# Patient Record
Sex: FEMALE | Race: OTHER | HISPANIC OR LATINO
[De-identification: names, ages, dates, MRNs, and addresses within clinical notes are randomized per-mention and may not be internally consistent; named-entity substitution may affect disease eponyms.]

---

## 2018-08-17 PROBLEM — Z00.00 ENCOUNTER FOR PREVENTIVE HEALTH EXAMINATION: Status: ACTIVE | Noted: 2018-08-17

## 2018-08-23 ENCOUNTER — APPOINTMENT (OUTPATIENT)
Dept: HEART AND VASCULAR | Facility: CLINIC | Age: 53
End: 2018-08-23
Payer: COMMERCIAL

## 2018-08-23 VITALS
HEART RATE: 74 BPM | SYSTOLIC BLOOD PRESSURE: 134 MMHG | DIASTOLIC BLOOD PRESSURE: 90 MMHG | WEIGHT: 155 LBS | BODY MASS INDEX: 28.52 KG/M2 | HEIGHT: 62 IN

## 2018-08-23 DIAGNOSIS — L71.9 ROSACEA, UNSPECIFIED: ICD-10-CM

## 2018-08-23 DIAGNOSIS — R07.9 CHEST PAIN, UNSPECIFIED: ICD-10-CM

## 2018-08-23 DIAGNOSIS — E78.5 HYPERLIPIDEMIA, UNSPECIFIED: ICD-10-CM

## 2018-08-23 DIAGNOSIS — I10 ESSENTIAL (PRIMARY) HYPERTENSION: ICD-10-CM

## 2018-08-23 PROCEDURE — 93000 ELECTROCARDIOGRAM COMPLETE: CPT

## 2018-08-23 PROCEDURE — 99205 OFFICE O/P NEW HI 60 MIN: CPT | Mod: 25

## 2018-08-23 RX ORDER — METRONIDAZOLE 7.5 MG/G
0.75 CREAM TOPICAL
Refills: 0 | Status: ACTIVE | COMMUNITY

## 2018-08-23 RX ORDER — ESOMEPRAZOLE MAGNESIUM 40 MG/1
CAPSULE, DELAYED RELEASE ORAL
Refills: 0 | Status: ACTIVE | COMMUNITY

## 2018-08-27 LAB
ALBUMIN SERPL ELPH-MCNC: 4.7 G/DL
ALDOSTERONE SERUM: 14.5 NG/DL
ALP BLD-CCNC: 79 U/L
ALT SERPL-CCNC: 13 U/L
ANION GAP SERPL CALC-SCNC: 13 MMOL/L
APPEARANCE: ABNORMAL
AST SERPL-CCNC: 19 U/L
BACTERIA: ABNORMAL
BASOPHILS # BLD AUTO: 0.01 K/UL
BASOPHILS NFR BLD AUTO: 0.2 %
BILIRUB SERPL-MCNC: 0.2 MG/DL
BILIRUBIN URINE: ABNORMAL
BLOOD URINE: NEGATIVE
BUN SERPL-MCNC: 12 MG/DL
CALCIUM SERPL-MCNC: 9.6 MG/DL
CALCIUM SERPL-MCNC: 9.6 MG/DL
CHLORIDE SERPL-SCNC: 104 MMOL/L
CHOLEST SERPL-MCNC: 204 MG/DL
CHOLEST/HDLC SERPL: 4.4 RATIO
CK MB BLD-MCNC: 1.3 NG/ML
CK MB CFR SERPL: 1.3 %
CK SERPL-CCNC: 103 U/L
CO2 SERPL-SCNC: 25 MMOL/L
COLOR: ABNORMAL
CREAT SERPL-MCNC: 0.8 MG/DL
CREAT SPEC-SCNC: 280 MG/DL
DEPRECATED D DIMER PPP IA-ACNC: 151 NG/ML DDU
EOSINOPHIL # BLD AUTO: 0.09 K/UL
EOSINOPHIL NFR BLD AUTO: 1.5 %
ERYTHROCYTE [SEDIMENTATION RATE] IN BLOOD BY WESTERGREN METHOD: 29 MM/HR
GLUCOSE QUALITATIVE U: NEGATIVE MG/DL
GLUCOSE SERPL-MCNC: 115 MG/DL
HBA1C MFR BLD HPLC: 5.9 %
HCT VFR BLD CALC: 40.5 %
HDLC SERPL-MCNC: 46 MG/DL
HGB BLD-MCNC: 13.1 G/DL
HYALINE CASTS: 0 /LPF
IMM GRANULOCYTES NFR BLD AUTO: 0 %
KETONES URINE: NEGATIVE
LDLC SERPL CALC-MCNC: 131 MG/DL
LEUKOCYTE ESTERASE URINE: ABNORMAL
LYMPHOCYTES # BLD AUTO: 1.78 K/UL
LYMPHOCYTES NFR BLD AUTO: 30.3 %
MAGNESIUM SERPL-MCNC: 1.9 MG/DL
MAN DIFF?: NORMAL
MCHC RBC-ENTMCNC: 31 PG
MCHC RBC-ENTMCNC: 32.3 GM/DL
MCV RBC AUTO: 96 FL
MICROALBUMIN 24H UR DL<=1MG/L-MCNC: 1.6 MG/DL
MICROALBUMIN/CREAT 24H UR-RTO: 6 MG/G
MICROSCOPIC-UA: NORMAL
MONOCYTES # BLD AUTO: 0.35 K/UL
MONOCYTES NFR BLD AUTO: 6 %
NEUTROPHILS # BLD AUTO: 3.65 K/UL
NEUTROPHILS NFR BLD AUTO: 62 %
NITRITE URINE: NEGATIVE
PARATHYROID HORMONE INTACT: 49 PG/ML
PH URINE: 6
PLATELET # BLD AUTO: 319 K/UL
POTASSIUM SERPL-SCNC: 4.2 MMOL/L
PROT SERPL-MCNC: 7.3 G/DL
PROTEIN URINE: ABNORMAL MG/DL
RBC # BLD: 4.22 M/UL
RBC # FLD: 13.3 %
RED BLOOD CELLS URINE: 1 /HPF
RENIN PLASMA: 11.1 PG/ML
SODIUM SERPL-SCNC: 142 MMOL/L
SPECIFIC GRAVITY URINE: 1.03
SQUAMOUS EPITHELIAL CELLS: 8 /HPF
TRIGL SERPL-MCNC: 135 MG/DL
TROPONIN I SERPL-MCNC: <0.01 NG/ML
TSH SERPL-ACNC: 1.45 UIU/ML
UROBILINOGEN URINE: 1 MG/DL
WBC # FLD AUTO: 5.88 K/UL
WHITE BLOOD CELLS URINE: 8 /HPF

## 2018-08-30 LAB
CORTICOSTEROID BIND GLOBULIN: 3.1 MG/DL
CORTIS SERPL-MCNC: 12 UG/DL
CORTISOL, FREE: 0.43 UG/DL
METANEPHRINE, PL: <10 PG/ML
NORMETANEPHRINE, PL: 38 PG/ML
PFCX: 3.6 %

## 2018-08-31 ENCOUNTER — OUTPATIENT (OUTPATIENT)
Dept: OUTPATIENT SERVICES | Facility: HOSPITAL | Age: 53
LOS: 1 days | End: 2018-08-31
Payer: COMMERCIAL

## 2018-08-31 DIAGNOSIS — Z98.89 OTHER SPECIFIED POSTPROCEDURAL STATES: Chronic | ICD-10-CM

## 2018-08-31 DIAGNOSIS — R07.9 CHEST PAIN, UNSPECIFIED: ICD-10-CM

## 2018-08-31 PROCEDURE — 93306 TTE W/DOPPLER COMPLETE: CPT

## 2018-08-31 PROCEDURE — 93306 TTE W/DOPPLER COMPLETE: CPT | Mod: 26

## 2018-09-06 ENCOUNTER — APPOINTMENT (OUTPATIENT)
Dept: CT IMAGING | Facility: HOSPITAL | Age: 53
End: 2018-09-06
Payer: COMMERCIAL

## 2018-09-06 ENCOUNTER — OUTPATIENT (OUTPATIENT)
Dept: OUTPATIENT SERVICES | Facility: HOSPITAL | Age: 53
LOS: 1 days | End: 2018-09-06
Payer: COMMERCIAL

## 2018-09-06 DIAGNOSIS — Z98.89 OTHER SPECIFIED POSTPROCEDURAL STATES: Chronic | ICD-10-CM

## 2018-09-06 PROCEDURE — 75574 CT ANGIO HRT W/3D IMAGE: CPT

## 2018-09-06 PROCEDURE — 75574 CT ANGIO HRT W/3D IMAGE: CPT | Mod: 26

## 2018-09-13 ENCOUNTER — APPOINTMENT (OUTPATIENT)
Dept: HEART AND VASCULAR | Facility: CLINIC | Age: 53
End: 2018-09-13

## 2018-09-19 VITALS
OXYGEN SATURATION: 100 % | SYSTOLIC BLOOD PRESSURE: 112 MMHG | HEIGHT: 62 IN | DIASTOLIC BLOOD PRESSURE: 70 MMHG | WEIGHT: 158.07 LBS | TEMPERATURE: 98 F | RESPIRATION RATE: 16 BRPM | HEART RATE: 68 BPM

## 2018-09-19 RX ORDER — CHLORHEXIDINE GLUCONATE 213 G/1000ML
1 SOLUTION TOPICAL ONCE
Qty: 0 | Refills: 0 | Status: DISCONTINUED | OUTPATIENT
Start: 2018-09-20 | End: 2018-09-21

## 2018-09-19 NOTE — H&P ADULT - NSHPREVIEWOFSYSTEMS_GEN_ALL_CORE
GEN Denies fever, chills, sweats,   SKIN +H/o Rosacea   HEENT Denies HA , vision changes, hearing changes  LUNG +Dry Cough, Denies sputum, wheeze, hemoptysis, pleurisy  CV  +C/P and SOB, Denies syncope  GI Denies abdominal pain, dysphagia, V/D, melena, BRBPR, hematemesis   URINARY Denies polyuria, urgency, dysuria, hematuria, 		  MSK Denies joint pains, joint swelling, falls, gait abnl, limited ROM  NEUROLOGY Denies fainting, seizures, tremor, speech changes  PSYCH Denies depression, anxiety, hallucinations, suicidal ideation

## 2018-09-19 NOTE — H&P ADULT - HISTORY OF PRESENT ILLNESS
54 y/o F w/ FMHX CAD/MI, PMHX HTN, HLD (on diet modification), Impaired Glucose Tolerance, GERD, Heart Murmur, who presented to their cardiologist c/o "stabbing and tight" intermittent L-sided 8-10 C/P at rest (CCS Angina Class 4 Sx) for the last 3 months w/ associated radiation to L arm (tightness and numbness/paresthesias), SOB, dizziness, diaphoresis, nausea, lasting 10-15 minutes. Pt also reports 2 pillow orthopnea/PND. Pt denies palpitations, LE edema, and syncope.  Pt had abnormal CTA coronaries on 9/6/18 revealing severe stenosis of the mid LAD secondary to noncalcific plaque. Normal LM, LCX and RCA. Echo in 8/2018 showed EF 60-65%, normal wall motion, consistent w/ impaired LV relaxation.   Due to pts risk factors, CCS Angina Class 4 Sx, abnormal CTA coronaries, pt is referred for cardiac catheterization w/ possible intervention.

## 2018-09-19 NOTE — H&P ADULT - PSH
Fibroid  s/p surgery  History of back surgery    History of cholecystectomy    History of tonsillectomy    History of tonsillectomy

## 2018-09-19 NOTE — H&P ADULT - ASSESSMENT
53 yr old F w/ FMHX CAD/MI, PMHX HTN, HLD (on diet modification), Impaired Glucose Tolerance, GERD, with CCS Angina Class IV equivalent symptoms and abnormal CT Angio Coronaries who presents for recommended cardiac catheterization with possible intervention.    ASA III                 Mallampati III    Risks & benefits of procedure and alternative therapy have been explained to the patient including but not limited to: allergic reaction, bleeding w/possible need for blood transfusion, infection, renal and vascular compromise, limb damage, arrhythmia, stroke, vessel dissection/perforation, Myocardial infarction, emergent CABG. Informed consent obtained and in chart.

## 2018-09-20 ENCOUNTER — INPATIENT (INPATIENT)
Facility: HOSPITAL | Age: 53
LOS: 0 days | Discharge: HOME | DRG: 247 | End: 2018-09-21
Attending: INTERNAL MEDICINE | Admitting: INTERNAL MEDICINE
Payer: COMMERCIAL

## 2018-09-20 DIAGNOSIS — D25.9 LEIOMYOMA OF UTERUS, UNSPECIFIED: Chronic | ICD-10-CM

## 2018-09-20 DIAGNOSIS — Z90.89 ACQUIRED ABSENCE OF OTHER ORGANS: Chronic | ICD-10-CM

## 2018-09-20 DIAGNOSIS — I20.9 ANGINA PECTORIS, UNSPECIFIED: ICD-10-CM

## 2018-09-20 DIAGNOSIS — Z90.49 ACQUIRED ABSENCE OF OTHER SPECIFIED PARTS OF DIGESTIVE TRACT: Chronic | ICD-10-CM

## 2018-09-20 DIAGNOSIS — Z98.89 OTHER SPECIFIED POSTPROCEDURAL STATES: Chronic | ICD-10-CM

## 2018-09-20 LAB
ALBUMIN SERPL ELPH-MCNC: 4.2 G/DL — SIGNIFICANT CHANGE UP (ref 3.3–5)
ALP SERPL-CCNC: 63 U/L — SIGNIFICANT CHANGE UP (ref 40–120)
ALT FLD-CCNC: 15 U/L — SIGNIFICANT CHANGE UP (ref 10–45)
ANION GAP SERPL CALC-SCNC: 13 MMOL/L — SIGNIFICANT CHANGE UP (ref 5–17)
APTT BLD: 32.4 SEC — SIGNIFICANT CHANGE UP (ref 27.5–37.4)
AST SERPL-CCNC: 23 U/L — SIGNIFICANT CHANGE UP (ref 10–40)
BASOPHILS NFR BLD AUTO: 0.2 % — SIGNIFICANT CHANGE UP (ref 0–2)
BILIRUB SERPL-MCNC: 0.3 MG/DL — SIGNIFICANT CHANGE UP (ref 0.2–1.2)
BUN SERPL-MCNC: 11 MG/DL — SIGNIFICANT CHANGE UP (ref 7–23)
CALCIUM SERPL-MCNC: 9.8 MG/DL — SIGNIFICANT CHANGE UP (ref 8.4–10.5)
CHLORIDE SERPL-SCNC: 102 MMOL/L — SIGNIFICANT CHANGE UP (ref 96–108)
CHOLEST SERPL-MCNC: 201 MG/DL — HIGH (ref 10–199)
CK MB CFR SERPL CALC: 1.6 NG/ML — SIGNIFICANT CHANGE UP (ref 0–6.7)
CK SERPL-CCNC: 133 U/L — SIGNIFICANT CHANGE UP (ref 25–170)
CO2 SERPL-SCNC: 25 MMOL/L — SIGNIFICANT CHANGE UP (ref 22–31)
CREAT SERPL-MCNC: 0.73 MG/DL — SIGNIFICANT CHANGE UP (ref 0.5–1.3)
EOSINOPHIL NFR BLD AUTO: 3.5 % — SIGNIFICANT CHANGE UP (ref 0–6)
GLUCOSE SERPL-MCNC: 113 MG/DL — HIGH (ref 70–99)
HBA1C BLD-MCNC: 5.3 % — SIGNIFICANT CHANGE UP (ref 4–5.6)
HCT VFR BLD CALC: 40.2 % — SIGNIFICANT CHANGE UP (ref 34.5–45)
HDLC SERPL-MCNC: 48 MG/DL — LOW
HGB BLD-MCNC: 12.8 G/DL — SIGNIFICANT CHANGE UP (ref 11.5–15.5)
INR BLD: 1.1 — SIGNIFICANT CHANGE UP (ref 0.88–1.16)
LIPID PNL WITH DIRECT LDL SERPL: 131 MG/DL — HIGH
LYMPHOCYTES # BLD AUTO: 32.4 % — SIGNIFICANT CHANGE UP (ref 13–44)
MCHC RBC-ENTMCNC: 30.5 PG — SIGNIFICANT CHANGE UP (ref 27–34)
MCHC RBC-ENTMCNC: 31.8 G/DL — LOW (ref 32–36)
MCV RBC AUTO: 95.9 FL — SIGNIFICANT CHANGE UP (ref 80–100)
MONOCYTES NFR BLD AUTO: 7.3 % — SIGNIFICANT CHANGE UP (ref 2–14)
NEUTROPHILS NFR BLD AUTO: 56.6 % — SIGNIFICANT CHANGE UP (ref 43–77)
PLATELET # BLD AUTO: 257 K/UL — SIGNIFICANT CHANGE UP (ref 150–400)
POTASSIUM SERPL-MCNC: 4 MMOL/L — SIGNIFICANT CHANGE UP (ref 3.5–5.3)
POTASSIUM SERPL-SCNC: 4 MMOL/L — SIGNIFICANT CHANGE UP (ref 3.5–5.3)
PROT SERPL-MCNC: 7.3 G/DL — SIGNIFICANT CHANGE UP (ref 6–8.3)
PROTHROM AB SERPL-ACNC: 12.2 SEC — SIGNIFICANT CHANGE UP (ref 9.8–12.7)
RBC # BLD: 4.19 M/UL — SIGNIFICANT CHANGE UP (ref 3.8–5.2)
RBC # FLD: 12.9 % — SIGNIFICANT CHANGE UP (ref 10.3–16.9)
SODIUM SERPL-SCNC: 140 MMOL/L — SIGNIFICANT CHANGE UP (ref 135–145)
TOTAL CHOLESTEROL/HDL RATIO MEASUREMENT: 4.2 RATIO — SIGNIFICANT CHANGE UP (ref 3.3–7.1)
TRIGL SERPL-MCNC: 108 MG/DL — SIGNIFICANT CHANGE UP (ref 10–149)
WBC # BLD: 5.6 K/UL — SIGNIFICANT CHANGE UP (ref 3.8–10.5)
WBC # FLD AUTO: 5.6 K/UL — SIGNIFICANT CHANGE UP (ref 3.8–10.5)

## 2018-09-20 PROCEDURE — 93010 ELECTROCARDIOGRAM REPORT: CPT

## 2018-09-20 PROCEDURE — 93458 L HRT ARTERY/VENTRICLE ANGIO: CPT | Mod: 26,XU

## 2018-09-20 PROCEDURE — 92928 PRQ TCAT PLMT NTRAC ST 1 LES: CPT | Mod: LD

## 2018-09-20 PROCEDURE — 93571 IV DOP VEL&/PRESS C FLO 1ST: CPT | Mod: 26

## 2018-09-20 RX ORDER — CLOPIDOGREL BISULFATE 75 MG/1
600 TABLET, FILM COATED ORAL ONCE
Qty: 0 | Refills: 0 | Status: COMPLETED | OUTPATIENT
Start: 2018-09-20 | End: 2018-09-20

## 2018-09-20 RX ORDER — SODIUM CHLORIDE 9 MG/ML
500 INJECTION INTRAMUSCULAR; INTRAVENOUS; SUBCUTANEOUS
Qty: 0 | Refills: 0 | Status: COMPLETED | OUTPATIENT
Start: 2018-09-20 | End: 2018-09-20

## 2018-09-20 RX ORDER — SODIUM CHLORIDE 9 MG/ML
250 INJECTION INTRAMUSCULAR; INTRAVENOUS; SUBCUTANEOUS ONCE
Qty: 0 | Refills: 0 | Status: DISCONTINUED | OUTPATIENT
Start: 2018-09-20 | End: 2018-09-21

## 2018-09-20 RX ORDER — ASPIRIN/CALCIUM CARB/MAGNESIUM 324 MG
325 TABLET ORAL ONCE
Qty: 0 | Refills: 0 | Status: COMPLETED | OUTPATIENT
Start: 2018-09-20 | End: 2018-09-20

## 2018-09-20 RX ORDER — SODIUM CHLORIDE 9 MG/ML
250 INJECTION INTRAMUSCULAR; INTRAVENOUS; SUBCUTANEOUS
Qty: 0 | Refills: 0 | Status: DISCONTINUED | OUTPATIENT
Start: 2018-09-20 | End: 2018-09-20

## 2018-09-20 RX ORDER — PANTOPRAZOLE SODIUM 20 MG/1
40 TABLET, DELAYED RELEASE ORAL
Qty: 0 | Refills: 0 | Status: DISCONTINUED | OUTPATIENT
Start: 2018-09-20 | End: 2018-09-21

## 2018-09-20 RX ORDER — METOPROLOL TARTRATE 50 MG
25 TABLET ORAL DAILY
Qty: 0 | Refills: 0 | Status: DISCONTINUED | OUTPATIENT
Start: 2018-09-20 | End: 2018-09-21

## 2018-09-20 RX ORDER — ASPIRIN/CALCIUM CARB/MAGNESIUM 324 MG
81 TABLET ORAL DAILY
Qty: 0 | Refills: 0 | Status: DISCONTINUED | OUTPATIENT
Start: 2018-09-20 | End: 2018-09-21

## 2018-09-20 RX ORDER — SODIUM CHLORIDE 9 MG/ML
500 INJECTION INTRAMUSCULAR; INTRAVENOUS; SUBCUTANEOUS
Qty: 0 | Refills: 0 | Status: DISCONTINUED | OUTPATIENT
Start: 2018-09-20 | End: 2018-09-20

## 2018-09-20 RX ORDER — ATORVASTATIN CALCIUM 80 MG/1
40 TABLET, FILM COATED ORAL AT BEDTIME
Qty: 0 | Refills: 0 | Status: DISCONTINUED | OUTPATIENT
Start: 2018-09-20 | End: 2018-09-21

## 2018-09-20 RX ORDER — CLOPIDOGREL BISULFATE 75 MG/1
75 TABLET, FILM COATED ORAL DAILY
Qty: 0 | Refills: 0 | Status: DISCONTINUED | OUTPATIENT
Start: 2018-09-21 | End: 2018-09-21

## 2018-09-20 RX ORDER — ATROPINE SULFATE 0.1 MG/ML
0.6 SYRINGE (ML) INJECTION ONCE
Qty: 0 | Refills: 0 | Status: DISCONTINUED | OUTPATIENT
Start: 2018-09-20 | End: 2018-09-21

## 2018-09-20 RX ADMIN — SODIUM CHLORIDE 75 MILLILITER(S): 9 INJECTION INTRAMUSCULAR; INTRAVENOUS; SUBCUTANEOUS at 07:48

## 2018-09-20 RX ADMIN — Medication 25 MILLIGRAM(S): at 18:54

## 2018-09-20 RX ADMIN — SODIUM CHLORIDE 75 MILLILITER(S): 9 INJECTION INTRAMUSCULAR; INTRAVENOUS; SUBCUTANEOUS at 15:47

## 2018-09-20 RX ADMIN — Medication 325 MILLIGRAM(S): at 07:50

## 2018-09-20 RX ADMIN — CLOPIDOGREL BISULFATE 600 MILLIGRAM(S): 75 TABLET, FILM COATED ORAL at 07:50

## 2018-09-20 RX ADMIN — ATORVASTATIN CALCIUM 40 MILLIGRAM(S): 80 TABLET, FILM COATED ORAL at 21:32

## 2018-09-20 NOTE — PATIENT PROFILE ADULT. - ALCOHOL USE HISTORY SINGLE SELECT
never
T(F): 97.6, Max: 98 (04-22 @ 15:48)  HR: 77 (68 - 77)  BP: 156/71 (132/69 - 156/71)  RR: 16 (14 - 16)  SpO2: 96% (90% - 96%)

## 2018-09-21 ENCOUNTER — TRANSCRIPTION ENCOUNTER (OUTPATIENT)
Age: 53
End: 2018-09-21

## 2018-09-21 VITALS
DIASTOLIC BLOOD PRESSURE: 74 MMHG | HEART RATE: 58 BPM | SYSTOLIC BLOOD PRESSURE: 159 MMHG | RESPIRATION RATE: 16 BRPM | OXYGEN SATURATION: 98 %

## 2018-09-21 LAB
ANION GAP SERPL CALC-SCNC: 11 MMOL/L — SIGNIFICANT CHANGE UP (ref 5–17)
BASOPHILS NFR BLD AUTO: 0.2 % — SIGNIFICANT CHANGE UP (ref 0–2)
BUN SERPL-MCNC: 11 MG/DL — SIGNIFICANT CHANGE UP (ref 7–23)
CALCIUM SERPL-MCNC: 9.1 MG/DL — SIGNIFICANT CHANGE UP (ref 8.4–10.5)
CHLORIDE SERPL-SCNC: 104 MMOL/L — SIGNIFICANT CHANGE UP (ref 96–108)
CO2 SERPL-SCNC: 26 MMOL/L — SIGNIFICANT CHANGE UP (ref 22–31)
CREAT SERPL-MCNC: 0.69 MG/DL — SIGNIFICANT CHANGE UP (ref 0.5–1.3)
EOSINOPHIL NFR BLD AUTO: 2.8 % — SIGNIFICANT CHANGE UP (ref 0–6)
GLUCOSE SERPL-MCNC: 112 MG/DL — HIGH (ref 70–99)
HCT VFR BLD CALC: 37.1 % — SIGNIFICANT CHANGE UP (ref 34.5–45)
HGB BLD-MCNC: 12 G/DL — SIGNIFICANT CHANGE UP (ref 11.5–15.5)
LYMPHOCYTES # BLD AUTO: 31.1 % — SIGNIFICANT CHANGE UP (ref 13–44)
MAGNESIUM SERPL-MCNC: 2 MG/DL — SIGNIFICANT CHANGE UP (ref 1.6–2.6)
MCHC RBC-ENTMCNC: 31.2 PG — SIGNIFICANT CHANGE UP (ref 27–34)
MCHC RBC-ENTMCNC: 32.3 G/DL — SIGNIFICANT CHANGE UP (ref 32–36)
MCV RBC AUTO: 96.4 FL — SIGNIFICANT CHANGE UP (ref 80–100)
MONOCYTES NFR BLD AUTO: 8.1 % — SIGNIFICANT CHANGE UP (ref 2–14)
NEUTROPHILS NFR BLD AUTO: 57.8 % — SIGNIFICANT CHANGE UP (ref 43–77)
PLATELET # BLD AUTO: 236 K/UL — SIGNIFICANT CHANGE UP (ref 150–400)
POTASSIUM SERPL-MCNC: 3.7 MMOL/L — SIGNIFICANT CHANGE UP (ref 3.5–5.3)
POTASSIUM SERPL-SCNC: 3.7 MMOL/L — SIGNIFICANT CHANGE UP (ref 3.5–5.3)
RBC # BLD: 3.85 M/UL — SIGNIFICANT CHANGE UP (ref 3.8–5.2)
RBC # FLD: 12.8 % — SIGNIFICANT CHANGE UP (ref 10.3–16.9)
SODIUM SERPL-SCNC: 141 MMOL/L — SIGNIFICANT CHANGE UP (ref 135–145)
WBC # BLD: 5.8 K/UL — SIGNIFICANT CHANGE UP (ref 3.8–10.5)
WBC # FLD AUTO: 5.8 K/UL — SIGNIFICANT CHANGE UP (ref 3.8–10.5)

## 2018-09-21 PROCEDURE — 93005 ELECTROCARDIOGRAM TRACING: CPT

## 2018-09-21 PROCEDURE — C1889: CPT

## 2018-09-21 PROCEDURE — 83036 HEMOGLOBIN GLYCOSYLATED A1C: CPT

## 2018-09-21 PROCEDURE — 85730 THROMBOPLASTIN TIME PARTIAL: CPT

## 2018-09-21 PROCEDURE — 80053 COMPREHEN METABOLIC PANEL: CPT

## 2018-09-21 PROCEDURE — 85025 COMPLETE CBC W/AUTO DIFF WBC: CPT

## 2018-09-21 PROCEDURE — 82550 ASSAY OF CK (CPK): CPT

## 2018-09-21 PROCEDURE — C1887: CPT

## 2018-09-21 PROCEDURE — 83735 ASSAY OF MAGNESIUM: CPT

## 2018-09-21 PROCEDURE — C1725: CPT

## 2018-09-21 PROCEDURE — C1874: CPT

## 2018-09-21 PROCEDURE — 82553 CREATINE MB FRACTION: CPT

## 2018-09-21 PROCEDURE — 80048 BASIC METABOLIC PNL TOTAL CA: CPT

## 2018-09-21 PROCEDURE — 80061 LIPID PANEL: CPT

## 2018-09-21 PROCEDURE — 99239 HOSP IP/OBS DSCHRG MGMT >30: CPT

## 2018-09-21 PROCEDURE — 85610 PROTHROMBIN TIME: CPT

## 2018-09-21 PROCEDURE — C1894: CPT

## 2018-09-21 PROCEDURE — 36415 COLL VENOUS BLD VENIPUNCTURE: CPT

## 2018-09-21 PROCEDURE — C1769: CPT

## 2018-09-21 RX ORDER — ATORVASTATIN CALCIUM 80 MG/1
1 TABLET, FILM COATED ORAL
Qty: 30 | Refills: 2
Start: 2018-09-21 | End: 2018-12-19

## 2018-09-21 RX ORDER — ACETAMINOPHEN 500 MG
650 TABLET ORAL ONCE
Qty: 0 | Refills: 0 | Status: COMPLETED | OUTPATIENT
Start: 2018-09-21 | End: 2018-09-21

## 2018-09-21 RX ORDER — METOPROLOL TARTRATE 50 MG
1 TABLET ORAL
Qty: 30 | Refills: 2
Start: 2018-09-21 | End: 2018-12-19

## 2018-09-21 RX ORDER — METOPROLOL TARTRATE 50 MG
1 TABLET ORAL
Qty: 0 | Refills: 0 | COMMUNITY

## 2018-09-21 RX ORDER — POTASSIUM CHLORIDE 20 MEQ
40 PACKET (EA) ORAL ONCE
Qty: 0 | Refills: 0 | Status: COMPLETED | OUTPATIENT
Start: 2018-09-21 | End: 2018-09-21

## 2018-09-21 RX ORDER — NITROGLYCERIN 6.5 MG
1 CAPSULE, EXTENDED RELEASE ORAL
Qty: 1 | Refills: 0
Start: 2018-09-21

## 2018-09-21 RX ORDER — ASPIRIN/CALCIUM CARB/MAGNESIUM 324 MG
1 TABLET ORAL
Qty: 30 | Refills: 11
Start: 2018-09-21 | End: 2019-09-15

## 2018-09-21 RX ORDER — ASPIRIN/CALCIUM CARB/MAGNESIUM 324 MG
1 TABLET ORAL
Qty: 0 | Refills: 0 | COMMUNITY

## 2018-09-21 RX ORDER — ATORVASTATIN CALCIUM 80 MG/1
1 TABLET, FILM COATED ORAL
Qty: 0 | Refills: 0 | COMMUNITY
Start: 2018-09-21

## 2018-09-21 RX ORDER — CLOPIDOGREL BISULFATE 75 MG/1
1 TABLET, FILM COATED ORAL
Qty: 30 | Refills: 11
Start: 2018-09-21 | End: 2019-09-15

## 2018-09-21 RX ORDER — CLOPIDOGREL BISULFATE 75 MG/1
1 TABLET, FILM COATED ORAL
Qty: 0 | Refills: 0 | COMMUNITY
Start: 2018-09-21

## 2018-09-21 RX ADMIN — Medication 650 MILLIGRAM(S): at 12:30

## 2018-09-21 RX ADMIN — Medication 40 MILLIEQUIVALENT(S): at 11:29

## 2018-09-21 RX ADMIN — Medication 25 MILLIGRAM(S): at 06:13

## 2018-09-21 RX ADMIN — PANTOPRAZOLE SODIUM 40 MILLIGRAM(S): 20 TABLET, DELAYED RELEASE ORAL at 06:13

## 2018-09-21 RX ADMIN — Medication 650 MILLIGRAM(S): at 11:30

## 2018-09-21 RX ADMIN — CLOPIDOGREL BISULFATE 75 MILLIGRAM(S): 75 TABLET, FILM COATED ORAL at 11:29

## 2018-09-21 RX ADMIN — Medication 81 MILLIGRAM(S): at 11:29

## 2018-09-21 NOTE — DISCHARGE NOTE ADULT - MEDICATION SUMMARY - MEDICATIONS TO TAKE
I will START or STAY ON the medications listed below when I get home from the hospital:    Aspirin Enteric Coated 81 mg oral delayed release tablet  -- 1 tab(s) by mouth once a day  -- Indication: For Coronary Artery Disease (Heart),  STENT    nitroglycerin 0.4 mg sublingual tablet  -- 1 tab(s) under tongue every 5 minutes, As Needed - for chest pain Maximum Up To three doses    Dispense 1 : vial  -- Indication: For Chest pain    atorvastatin 40 mg oral tablet  -- 1 tab(s) by mouth once a day (at bedtime)  -- Indication: For Hyperlipidemia (Cholesterol), Coronary Artery Disease (Heart    clopidogrel 75 mg oral tablet  -- 1 tab(s) by mouth once a day  -- Indication: For Coronary Artery Disease (Heart), STENT    Toprol-XL 25 mg oral tablet, extended release  -- 1 tab(s) by mouth once a day  -- Indication: For Coronary Artery Disease (Heart), Hypertension (Blood Pressure)    metroNIDAZOLE 0.75% topical gel  -- Apply on skin to affected area 2 times a day  -- Indication: For Skin    omeprazole 20 mg oral delayed release capsule  -- 1 cap(s) by mouth once a day  -- Indication: For Stomach

## 2018-09-21 NOTE — DISCHARGE NOTE ADULT - CARE PLAN
Principal Discharge DX:	Coronary artery disease  Goal:	Maintain Low Fat, Low Salt, and Low Cholesterol Diet  Assessment and plan of treatment:	You had a cardiac angiogram with stent placement to one of your heart arteries (Left Anterior Descending). Continue Aspirin. You were started on Plavix to keep your stent open. Please continue on discharge. You were started on cholesterol medication  Secondary Diagnosis:	Hyperlipidemia Principal Discharge DX:	Coronary artery disease  Goal:	Maintain Low Fat, Low Salt, and Low Cholesterol Diet  Assessment and plan of treatment:	You had a cardiac angiogram with stent placement to one of your heart arteries (Left Anterior Descending). Continue Aspirin. You were started on Plavix to keep your stent open. Please continue on discharge. You were started on cholesterol medication called Lipitor. HAVE LIVER FUNCTION AND CHOLESTEROL LEVELS CHECKED IN 1 MONTH. Continue Toprol XL. Nitroglycerin 0.4 mg Sublingual every 5 minutes as needed for chest pain max up to three doses.  Secondary Diagnosis:	Hyperlipidemia  Assessment and plan of treatment:	You were started on cholesterol medication called Lipitor. HAVE LIVER FUNCTION AND CHOLESTEROL LEVELS CHECKED IN 1 MONTH.

## 2018-09-21 NOTE — DISCHARGE NOTE ADULT - PLAN OF CARE
Maintain Low Fat, Low Salt, and Low Cholesterol Diet You had a cardiac angiogram with stent placement to one of your heart arteries (Left Anterior Descending). Continue Aspirin. You were started on Plavix to keep your stent open. Please continue on discharge. You were started on cholesterol medication You had a cardiac angiogram with stent placement to one of your heart arteries (Left Anterior Descending). Continue Aspirin. You were started on Plavix to keep your stent open. Please continue on discharge. You were started on cholesterol medication called Lipitor. HAVE LIVER FUNCTION AND CHOLESTEROL LEVELS CHECKED IN 1 MONTH. Continue Toprol XL. Nitroglycerin 0.4 mg Sublingual every 5 minutes as needed for chest pain max up to three doses. You were started on cholesterol medication called Lipitor. HAVE LIVER FUNCTION AND CHOLESTEROL LEVELS CHECKED IN 1 MONTH.

## 2018-09-21 NOTE — DISCHARGE NOTE ADULT - HOSPITAL COURSE
52 y/o F w/ FMHX CAD/MI, PMHX HTN, HLD (on diet modification), Impaired Glucose Tolerance, GERD, Heart Murmur, who presented to their cardiologist c/o "stabbing and tight" intermittent L-sided 8-10 C/P at rest (CCS Angina Class 4 Sx) for the last 3 months w/ associated radiation to L arm (tightness and numbness/paresthesias), SOB, dizziness, diaphoresis, nausea, lasting 10-15 minutes. Pt also reports 2 pillow orthopnea/PND. Pt denies palpitations, LE edema, and syncope.  Pt had abnormal CTA coronaries on 9/6/18 revealing severe stenosis of the mid LAD secondary to noncalcific plaque. Normal LM, LCX and RCA. Echo in 8/2018 showed EF 60-65%, normal wall motion, consistent w/ impaired LV relaxation. Due to pts risk factors, CCS Angina Class 4 Sx, abnormal CTA coronaries, pt referred for cardiac catheterization w/ possible intervention. Patient s/p cardiac cath 9/20/18 LM nl, pLAD 80% FFR 0.75 at Diag bifurcation, Successful LISA prox LAD, LCx & RCA normal, EF normal. During radial band removal patient experienced vasovagal episode given 250 cc bolus NS and atropine 0.6 with recovery. Patient with chest pain post PCI now resolved. Labs and telemetry reviewed. Hypokalemia K+3.7 Kdur 40 mEq PO x 1 given; Magnesium 2.0. Patient C/P free and HD stable and cleared for discharge by Dr. Ackerman. Prescriptions for ASA, Plavix, Toprol XL, Lipitor E-Prescribed to [facility].   	  V/S:	BP: 140-150’s/70s 		HR: 60s	RR: 16 	Temp: 96.6 F  Monitor-NSR 			O2 sat- 99% RA   	  GENERAL:  Sitting in bed in no acute distress  CVS: + S1 S2. RRR. No murmurs, rubs or gallops.   Pulm: CTA Bilaterally. No rhonchi, wheezes, or rales.  Abd: BS x 4. Soft NT/ND.  Ext: No clubbing, cyanosis, or edema BLE. No calf tenderness BLE.   Right Radial: Radial Pulse 2+. No hematoma or bleed. Sensation Intact. 5/5  Strength

## 2018-09-21 NOTE — DISCHARGE NOTE ADULT - PATIENT PORTAL LINK FT
You can access the [facility], offered by [facility], by registering with the following website: [email or web address]

## 2018-09-21 NOTE — DISCHARGE NOTE ADULT - INSTRUCTIONS
You underwent a coronary angiogram and should wait 3 days before returning to ordinary activities. Do not drive for 2 days. Consult your doctor before returning to vigorous activity. You may return to work in 3-5 days. The catheter from your right wrist was removed. . You may shower once the dressing is removed, but avoid baths, hot tubs, or swimming for 5 days to prevent infection. If you notice bleeding from the site, hardening and pain at the site, drainage or redness from the site, coolness/paleness of the extremity, swelling, or fever, please call [phone]. Please continue your Aspirin and Plavix as prescribed unless otherwise indicated by your cardiologist. All of your prescriptions have been sent to the pharmacy. Please follow up with Dr. Dimitri Key in 1-2 weeks. All of your needed prescriptions have been sent electronically to your pharmacy.

## 2018-09-24 PROBLEM — L71.9 ROSACEA, UNSPECIFIED: Chronic | Status: ACTIVE | Noted: 2018-09-19

## 2018-09-25 DIAGNOSIS — E87.6 HYPOKALEMIA: ICD-10-CM

## 2018-09-25 DIAGNOSIS — I10 ESSENTIAL (PRIMARY) HYPERTENSION: ICD-10-CM

## 2018-09-25 DIAGNOSIS — E78.5 HYPERLIPIDEMIA, UNSPECIFIED: ICD-10-CM

## 2018-09-25 DIAGNOSIS — I25.10 ATHEROSCLEROTIC HEART DISEASE OF NATIVE CORONARY ARTERY WITHOUT ANGINA PECTORIS: ICD-10-CM

## 2018-09-25 DIAGNOSIS — Z82.49 FAMILY HISTORY OF ISCHEMIC HEART DISEASE AND OTHER DISEASES OF THE CIRCULATORY SYSTEM: ICD-10-CM

## 2018-09-25 DIAGNOSIS — K21.9 GASTRO-ESOPHAGEAL REFLUX DISEASE WITHOUT ESOPHAGITIS: ICD-10-CM

## 2018-10-04 ENCOUNTER — APPOINTMENT (OUTPATIENT)
Dept: HEART AND VASCULAR | Facility: CLINIC | Age: 53
End: 2018-10-04
Payer: COMMERCIAL

## 2018-10-04 ENCOUNTER — APPOINTMENT (OUTPATIENT)
Dept: ENDOCRINOLOGY | Facility: CLINIC | Age: 53
End: 2018-10-04
Payer: COMMERCIAL

## 2018-10-04 VITALS
DIASTOLIC BLOOD PRESSURE: 70 MMHG | HEIGHT: 62 IN | BODY MASS INDEX: 29.08 KG/M2 | HEART RATE: 76 BPM | WEIGHT: 158 LBS | SYSTOLIC BLOOD PRESSURE: 150 MMHG

## 2018-10-04 VITALS — DIASTOLIC BLOOD PRESSURE: 90 MMHG | SYSTOLIC BLOOD PRESSURE: 150 MMHG

## 2018-10-04 PROCEDURE — 97802 MEDICAL NUTRITION INDIV IN: CPT

## 2018-10-04 PROCEDURE — 99213 OFFICE O/P EST LOW 20 MIN: CPT

## 2018-10-05 ENCOUNTER — TRANSCRIPTION ENCOUNTER (OUTPATIENT)
Age: 53
End: 2018-10-05

## 2018-11-15 ENCOUNTER — NON-APPOINTMENT (OUTPATIENT)
Age: 53
End: 2018-11-15

## 2018-11-15 ENCOUNTER — APPOINTMENT (OUTPATIENT)
Dept: HEART AND VASCULAR | Facility: CLINIC | Age: 53
End: 2018-11-15
Payer: COMMERCIAL

## 2018-11-15 VITALS
DIASTOLIC BLOOD PRESSURE: 64 MMHG | HEIGHT: 62 IN | HEART RATE: 58 BPM | WEIGHT: 152 LBS | BODY MASS INDEX: 27.97 KG/M2 | SYSTOLIC BLOOD PRESSURE: 150 MMHG

## 2018-11-15 VITALS — SYSTOLIC BLOOD PRESSURE: 128 MMHG | DIASTOLIC BLOOD PRESSURE: 70 MMHG

## 2018-11-15 PROCEDURE — 99213 OFFICE O/P EST LOW 20 MIN: CPT

## 2018-11-15 PROCEDURE — 93000 ELECTROCARDIOGRAM COMPLETE: CPT

## 2018-11-15 NOTE — DISCUSSION/SUMMARY
[Patient] : the patient [___ Month(s)] : [unfilled] month(s) [FreeTextEntry1] : 54 y/o female with h/o htn, hl, predm, family h/o early cvd, gerd, overweight, cad who presents for f/up of cp s/p LISA pLAD\par \par -CTA 9/18 with severe stenosis noncalcified plaque mid LAD\par -Echo 9/18: normal lv size/thickness, no wma, ef 60-65%, no valvular disease\par -toprol and asa and lipitor \par -cath 9/20/18 LM nl, pLAD 80% s/p PROMUS LISA, Lcx/Rca normal, ef 65%\par bifurcation, Successful LISA prox LAD, LCx & RCA normal, EF normal. \par ekg 9/18- nsr, normal intervals, no st/t changes\par -ordered ekg today - SB, normal intervals, no st/t changes\par -close monitoring of chest discomfort, if changes or increases consider relook angio\par -labs 9/18 reviewed\par -ordered CAIN w doppler\par -counseled on cvd risk factors\par -repeat lipids 12/18\par -close monitoring of bp\par -f/up 1 month for lipids\par \par

## 2018-11-15 NOTE — HISTORY OF PRESENT ILLNESS
[FreeTextEntry1] : 52 y/o female with h/o htn, hl, predm, family h/o early cvd, overweight, gerd\par who presents for f/upl\par \par Seen for cp and had CTA  with severe stenosis noncalcified plaque mid LAD\par Echo : normal lv size/thickness, no wma, ef 60-65%, no valvular disease\par Started on toprol and asa and lipitor \par Stopped HRT\par \par Sent for cath 18 LM nl, pLAD 80% FFR 0.75 - s/p LISA Promus pLAD, normal Lcx/RCA, ef 65%, no as/mr, edp 11\par bifurcation, Successful LISA prox LAD, LCx & RCA normal, EF normal. \par \par \par Notes some fleeting sharp chest discomfort for past 2 weeks on/off\par No sob, palpitations, syncope, edema, orthopnea, pnd\par \par \par Stress level high\par \par \par HTN diagnosed \par HL diagnosed \par \par Had 1 ectopic and 2 miscarriages\par \par Started ocp for menopausal symptoms past year. 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

## 2018-12-03 ENCOUNTER — APPOINTMENT (OUTPATIENT)
Dept: ULTRASOUND IMAGING | Facility: HOSPITAL | Age: 53
End: 2018-12-03
Payer: COMMERCIAL

## 2018-12-03 ENCOUNTER — OUTPATIENT (OUTPATIENT)
Dept: OUTPATIENT SERVICES | Facility: HOSPITAL | Age: 53
LOS: 1 days | End: 2018-12-03
Payer: COMMERCIAL

## 2018-12-03 DIAGNOSIS — Z90.89 ACQUIRED ABSENCE OF OTHER ORGANS: Chronic | ICD-10-CM

## 2018-12-03 DIAGNOSIS — Z90.49 ACQUIRED ABSENCE OF OTHER SPECIFIED PARTS OF DIGESTIVE TRACT: Chronic | ICD-10-CM

## 2018-12-03 DIAGNOSIS — Z98.89 OTHER SPECIFIED POSTPROCEDURAL STATES: Chronic | ICD-10-CM

## 2018-12-03 DIAGNOSIS — D25.9 LEIOMYOMA OF UTERUS, UNSPECIFIED: Chronic | ICD-10-CM

## 2018-12-03 PROCEDURE — 76770 US EXAM ABDO BACK WALL COMP: CPT

## 2018-12-03 PROCEDURE — 93975 VASCULAR STUDY: CPT

## 2018-12-03 PROCEDURE — 76770 US EXAM ABDO BACK WALL COMP: CPT | Mod: 26,59

## 2018-12-03 PROCEDURE — 93975 VASCULAR STUDY: CPT | Mod: 26

## 2018-12-10 ENCOUNTER — APPOINTMENT (OUTPATIENT)
Dept: HEART AND VASCULAR | Facility: CLINIC | Age: 53
End: 2018-12-10
Payer: COMMERCIAL

## 2018-12-10 VITALS
DIASTOLIC BLOOD PRESSURE: 80 MMHG | HEART RATE: 51 BPM | SYSTOLIC BLOOD PRESSURE: 124 MMHG | WEIGHT: 152 LBS | BODY MASS INDEX: 27.8 KG/M2

## 2018-12-10 PROCEDURE — 36415 COLL VENOUS BLD VENIPUNCTURE: CPT

## 2018-12-10 PROCEDURE — 99213 OFFICE O/P EST LOW 20 MIN: CPT | Mod: 25

## 2018-12-10 NOTE — HISTORY OF PRESENT ILLNESS
[FreeTextEntry1] : 54 y/o female with h/o htn, hl, predm, family h/o early cvd, overweight, gerd who presents for f/up\par \par Still notes some quick pinching cp occuring every few days, lasts seconds. \par Seen for cp and had CTA  with severe stenosis noncalcified plaque mid LAD\par Echo : normal lv size/thickness, no wma, ef 60-65%, no valvular disease\par Started on toprol and asa and lipitor \par Stopped HRT\par \par Sent for cath 18 LM nl, pLAD 80% FFR 0.75 - s/p LISA Promus pLAD, normal Lcx/RCA, ef 65%, no as/mr, edp 11\par bifurcation, Successful LISA prox LAD, LCx & RCA normal, EF normal. \par \par CAIN w doppler:  - normal\par \par No sob, palpitations, syncope, edema, orthopnea, pnd\par \par \par Stress level high\par \par \par HTN diagnosed \par HL diagnosed \par \par Had 1 ectopic and 2 miscarriages\par \par Started ocp for menopausal symptoms past year. 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 \par \par MEDS:\par nexium qd\par asa 81 mg qd\par plavix 75 mg qd\par lipitor 40 mg qhs\par toprol 50 mg qd\par omeprazole\par metronidazole topical .75% bid\par \par \par \par FH:\par mother - lupus,  [place] 74 (CVA - 44)\par father - dm,  57 from [place]\par sister -  pancreatic cancer 67\par brother -  Alzheimers 63\par brother - alive, 65, healthy\par brother - alive, 49, healthy \par \par \par EXAM:  US RETROPERITONEAL COMPLETE                      \par \par EXAM:  US DPLX PELVIC                      \par \par PROCEDURE DATE:  2018  \par \par \par \par INTERPRETATION:  RENAL and BLADDER ULTRASOUND and RENAL ARTERIAL DUPLEX \par DOPPLER dated 12/3/2018 12:23 PM\par \par INDICATION: Assess for renal artery stenosis. History of essential \par hypertension.\par \par PRIOR STUDIES: None.\par \par TECHNIQUE: Ultrasound evaluation of the kidneys was performed. Duplex \par Doppler evaluation of the renal arteries was performed bilaterally \par utilizing grayscale imaging, color flow Doppler, and spectral Doppler \par waveform analysis. \par \par RIGHT KIDNEY:\par Length: 10.7 cm\par Lesions: None\par Hydronephrosis: None\par Stones: None\par Echogenicity: Normal\par \par RIGHT RENAL ARTERIAL DOPPLER:\par Main renal artery maximum peak systolic velocity: 101 cm/sec, end \par diastolic volume 28.2 cm/sec.\par Segmental renal artery resistive indices: Normal (< 0.8)\par Segmental renal artery acceleration times: Normal (< 70 msec)\par Segmental renal artery acceleration indices: Abnormal (> 300 cm/sec2)\par \par LEFT KIDNEY:\par Length: 10.6 cm\par Lesions: None\par Hydronephrosis: None\par Stones: None\par Echogenicity: Normal\par \par LEFT RENAL ARTERIAL DOPPLER:\par Main renal artery maximum peak systolic velocity: 59.8 cm/sec, \par end-diastolic volume 14.7 cm/sec\par Segmental renal artery resistive indices: Normal (< 0.8)\par Segmental renal artery acceleration times: Normal (< 70 msec)\par Segmental renal artery acceleration indices: Normal (> 300 cm/sec2)\par \par ABDOMINAL AORTA:\par Maximum peak systolic velocity: 96.3 cm/sec.\par \par URINARY BLADDER:\par Ureteral jets: Both visible.\par Filling defects: None\par Bladder volume: Pre-void: 276 ml; Post-void: 13 ml.\par \par \par IMPRESSION:\par No evidence of stenosis or abnormal flow of the renal arteries \par bilaterally with normal appearing kidneys. Abnormal segmental right renal \par artery acceleration indices which is nonspecific. \par \par \par \par \par \par "Thank you for the opportunity to participate in the care of this \par patient."\par \par MUSA FOWLER M.D., RADIOLOGY RESIDENT\par This document has been electronically signed.\par WALTER ZIMMER M.D., ATTENDING RADIOLOGIST\par This document has been electronically signed. Dec  3 2018  1:59PM\par   \par \par   \par \par

## 2018-12-10 NOTE — DISCUSSION/SUMMARY
[Patient] : the patient [___ Month(s)] : [unfilled] month(s) [FreeTextEntry1] : \par 54 y/o female with h/o htn, hl, predm, family h/o early cvd, gerd, overweight, cad who presents for f/up of cp s/p LISA pLAD\par \par -CTA 9/18 with severe stenosis noncalcified plaque mid LAD\par -Echo 9/18: normal lv size/thickness, no wma, ef 60-65%, no valvular disease\par -toprol and asa and lipitor \par -cath 9/20/18 LM nl, pLAD 80% s/p PROMUS LISA, Lcx/Rca normal, ef 65%\par bifurcation, Successful LISA prox LAD, LCx & RCA normal, EF normal. \par -ekg 11/18 - SB, normal intervals, no st/t changes\par -labs 9/18 reviewed\par -CAIN w doppler 12/18: normal\par -counseled on cvd risk factors\par -ordered lipids today\par -close monitoring of bp\par -monitor cp closely and can consider imaging if needed next visit although cp seems atypical\par -f/up 3 months\par \par

## 2018-12-13 ENCOUNTER — RX RENEWAL (OUTPATIENT)
Age: 53
End: 2018-12-13

## 2018-12-13 ENCOUNTER — TRANSCRIPTION ENCOUNTER (OUTPATIENT)
Age: 53
End: 2018-12-13

## 2018-12-13 LAB
CHOLEST SERPL-MCNC: 108 MG/DL
CHOLEST/HDLC SERPL: 2.5 RATIO
HDLC SERPL-MCNC: 44 MG/DL
LDLC SERPL CALC-MCNC: 44 MG/DL
TRIGL SERPL-MCNC: 100 MG/DL

## 2019-03-08 ENCOUNTER — TRANSCRIPTION ENCOUNTER (OUTPATIENT)
Age: 54
End: 2019-03-08

## 2019-03-11 ENCOUNTER — APPOINTMENT (OUTPATIENT)
Dept: HEART AND VASCULAR | Facility: CLINIC | Age: 54
End: 2019-03-11
Payer: COMMERCIAL

## 2019-03-11 VITALS
SYSTOLIC BLOOD PRESSURE: 148 MMHG | BODY MASS INDEX: 28.16 KG/M2 | OXYGEN SATURATION: 98 % | HEART RATE: 53 BPM | WEIGHT: 153 LBS | DIASTOLIC BLOOD PRESSURE: 82 MMHG | HEIGHT: 62 IN

## 2019-03-11 PROCEDURE — 99213 OFFICE O/P EST LOW 20 MIN: CPT

## 2019-03-11 NOTE — HISTORY OF PRESENT ILLNESS
[FreeTextEntry1] : 52 y/o female with h/o htn, hl, predm, family h/o early cvd, overweight, gerd who presents for f/up\par \par \par Seen for cp and had CTA  with severe stenosis noncalcified plaque mid LAD\par Echo : normal lv size/thickness, no wma, ef 60-65%, no valvular disease\par \par \par \par Sent for cath 18 LM nl, pLAD 80% FFR 0.75 - s/p LISA Promus pLAD, normal Lcx/RCA, ef 65%, no as/mr, edp 11\par bifurcation, Successful LISA prox LAD, LCx & RCA normal, EF normal. \par \par CAIN w doppler:  - normal\par \par No sob, palpitations, syncope, edema, orthopnea, pnd\par Notes some brief rare atypical cp\par \par Stress level high\par \par \par HTN diagnosed \par HL diagnosed \par \par Had 1 ectopic and 2 miscarriages\par 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 \par \par MEDS:\par nexium qd\par asa 81 mg qd\par plavix 75 mg qd\par lipitor 40 mg qhs\par toprol 50 mg qd\par omeprazole\par metronidazole topical .75% bid\par \par \par \par FH:\par mother - lupus,  [place] 74 (CVA - 44)\par father - dm,  57 from [place]\par sister -  pancreatic cancer 67\par brother -  Alzheimers 63\par brother - alive, 65, healthy\par brother - alive, 49, healthy \par \par \par EXAM: US RETROPERITONEAL COMPLETE \par \par EXAM: US DPLX PELVIC \par \par PROCEDURE DATE: 2018 \par \par \par \par INTERPRETATION: RENAL and BLADDER ULTRASOUND and RENAL ARTERIAL DUPLEX \par DOPPLER dated 12/3/2018 12:23 PM\par \par INDICATION: Assess for renal artery stenosis. History of essential \par hypertension.\par \par PRIOR STUDIES: None.\par \par TECHNIQUE: Ultrasound evaluation of the kidneys was performed. Duplex \par Doppler evaluation of the renal arteries was performed bilaterally \par utilizing grayscale imaging, color flow Doppler, and spectral Doppler \par waveform analysis. \par \par RIGHT KIDNEY:\par Length: 10.7 cm\par Lesions: None\par Hydronephrosis: None\par Stones: None\par Echogenicity: Normal\par \par RIGHT RENAL ARTERIAL DOPPLER:\par Main renal artery maximum peak systolic velocity: 101 cm/sec, end \par diastolic volume 28.2 cm/sec.\par Segmental renal artery resistive indices: Normal (< 0.8)\par Segmental renal artery acceleration times: Normal (< 70 msec)\par Segmental renal artery acceleration indices: Abnormal (> 300 cm/sec2)\par \par LEFT KIDNEY:\par Length: 10.6 cm\par Lesions: None\par Hydronephrosis: None\par Stones: None\par Echogenicity: Normal\par \par LEFT RENAL ARTERIAL DOPPLER:\par Main renal artery maximum peak systolic velocity: 59.8 cm/sec, \par end-diastolic volume 14.7 cm/sec\par Segmental renal artery resistive indices: Normal (< 0.8)\par Segmental renal artery acceleration times: Normal (< 70 msec)\par Segmental renal artery acceleration indices: Normal (> 300 cm/sec2)\par \par ABDOMINAL AORTA:\par Maximum peak systolic velocity: 96.3 cm/sec.\par \par URINARY BLADDER:\par Ureteral jets: Both visible.\par Filling defects: None\par Bladder volume: Pre-void: 276 ml; Post-void: 13 ml.\par \par \par IMPRESSION:\par No evidence of stenosis or abnormal flow of the renal arteries \par bilaterally with normal appearing kidneys. Abnormal segmental right renal \par artery acceleration indices which is nonspecific. \par \par \par \par \par \par "Thank you for the opportunity to participate in the care of this \par patient."\par \par MUSA FOWLER M.D., RADIOLOGY RESIDENT\par This document has been electronically signed.\par WALTER ZIMMER M.D., ATTENDING RADIOLOGIST\par This document has been electronically signed. Dec 3 2018 1:59PM\par  \par \par  \par \par

## 2019-03-11 NOTE — DISCUSSION/SUMMARY
[___ Month(s)] : [unfilled] month(s) [Patient] : the patient [___ Week(s)] : [unfilled] week(s) [FreeTextEntry1] : 52 y/o female with h/o htn, hl, predm, family h/o early cvd, gerd, overweight, cad who presents for f/up of cp s/p LISA pLAD\par \par -CTA 9/18 with severe stenosis noncalcified plaque mid LAD\par -Echo 9/18: normal lv size/thickness, no wma, ef 60-65%, no valvular disease\par -toprol and asa and lipitor \par -cath 9/20/18 LM nl, pLAD 80% s/p PROMUS LISA, Lcx/Rca normal, ef 65%\par bifurcation, Successful LISA prox LAD, LCx & RCA normal, EF normal. \par -ekg 11/18 - SB, normal intervals, no st/t changes\par -labs 9/18 reviewed\par -CAIN w doppler 12/18: normal\par -counseled on cvd risk factors\par -close monitoring of bp, add norvasc 2.5 mg qd (HR low - cannot uptitrate BB)\par -monitor cp closely and can consider imaging if needed next visit although cp seems atypical\par -f/up 3 weeks for bp\par

## 2019-04-05 ENCOUNTER — RX RENEWAL (OUTPATIENT)
Age: 54
End: 2019-04-05

## 2019-04-29 ENCOUNTER — APPOINTMENT (OUTPATIENT)
Dept: HEART AND VASCULAR | Facility: CLINIC | Age: 54
End: 2019-04-29
Payer: COMMERCIAL

## 2019-04-29 VITALS — DIASTOLIC BLOOD PRESSURE: 90 MMHG | SYSTOLIC BLOOD PRESSURE: 138 MMHG

## 2019-04-29 VITALS
BODY MASS INDEX: 27.79 KG/M2 | WEIGHT: 151 LBS | DIASTOLIC BLOOD PRESSURE: 86 MMHG | HEART RATE: 63 BPM | HEIGHT: 62 IN | SYSTOLIC BLOOD PRESSURE: 142 MMHG

## 2019-04-29 PROCEDURE — 99213 OFFICE O/P EST LOW 20 MIN: CPT

## 2019-04-29 NOTE — HISTORY OF PRESENT ILLNESS
[FreeTextEntry1] : 52 y/o female with h/o htn, hl, predm, family h/o early cvd, overweight, gerd who presents for f/up\par \par Last visit started on norvasc\par \par Seen for cp and had CTA  with severe stenosis noncalcified plaque mid LAD\par Echo : normal lv size/thickness, no wma, ef 60-65%, no valvular disease\par \par \par \par Sent for cath 18 LM nl, pLAD 80% FFR 0.75 - s/p LISA Promus pLAD, normal Lcx/RCA, ef 65%, no as/mr, edp 11\par bifurcation, Successful LISA prox LAD, LCx & RCA normal, EF normal. \par \par CAIN w doppler:  - normal\par \par No cp, sob, palpitations, syncope, edema, orthopnea, pnd\par \par \par Stress level high\par \par \par HTN diagnosed \par HL diagnosed \par \par Had 1 ectopic and 2 miscarriages\par 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 \par \par MEDS:\par nexium qd\par asa 81 mg qd\par plavix 75 mg qd\par lipitor 40 mg qhs\par toprol 50 mg qd\par omeprazole\par metronidazole topical .75% bid\par norvasc 2.5 mg qd\par \par \par \par FH:\par mother - lupus,  MI 74 (CVA - 44)\par father - dm,  57 from [place]\par sister -  pancreatic cancer 67\par brother -  Alzheimers 63\par brother - alive, 65, healthy\par brother - alive, 49, healthy \par \par \par EXAM: US RETROPERITONEAL COMPLETE \par \par EXAM: US DPLX PELVIC \par \par PROCEDURE DATE: 2018 \par \par \par \par INTERPRETATION: RENAL and BLADDER ULTRASOUND and RENAL ARTERIAL DUPLEX \par DOPPLER dated 12/3/2018 12:23 PM\par \par INDICATION: Assess for renal artery stenosis. History of essential \par hypertension.\par \par PRIOR STUDIES: None.\par \par TECHNIQUE: Ultrasound evaluation of the kidneys was performed. Duplex \par Doppler evaluation of the renal arteries was performed bilaterally \par utilizing grayscale imaging, color flow Doppler, and spectral Doppler \par waveform analysis. \par \par RIGHT KIDNEY:\par Length: 10.7 cm\par Lesions: None\par Hydronephrosis: None\par Stones: None\par Echogenicity: Normal\par \par RIGHT RENAL ARTERIAL DOPPLER:\par Main renal artery maximum peak systolic velocity: 101 cm/sec, end \par diastolic volume 28.2 cm/sec.\par Segmental renal artery resistive indices: Normal (< 0.8)\par Segmental renal artery acceleration times: Normal (< 70 msec)\par Segmental renal artery acceleration indices: Abnormal (> 300 cm/sec2)\par \par LEFT KIDNEY:\par Length: 10.6 cm\par Lesions: None\par Hydronephrosis: None\par Stones: None\par Echogenicity: Normal\par \par LEFT RENAL ARTERIAL DOPPLER:\par Main renal artery maximum peak systolic velocity: 59.8 cm/sec, \par end-diastolic volume 14.7 cm/sec\par Segmental renal artery resistive indices: Normal (< 0.8)\par Segmental renal artery acceleration times: Normal (< 70 msec)\par Segmental renal artery acceleration indices: Normal (> 300 cm/sec2)\par \par ABDOMINAL AORTA:\par Maximum peak systolic velocity: 96.3 cm/sec.\par \par URINARY BLADDER:\par Ureteral jets: Both visible.\par Filling defects: None\par Bladder volume: Pre-void: 276 ml; Post-void: 13 ml.\par \par \par IMPRESSION:\par No evidence of stenosis or abnormal flow of the renal arteries \par bilaterally with normal appearing kidneys. Abnormal segmental right renal \par artery acceleration indices which is nonspecific. \par \par \par \par \par \par "Thank you for the opportunity to participate in the care of this \par patient."\par \par MUSA FOWLER M.D., RADIOLOGY RESIDENT\par This document has been electronically signed.\par WALTER ZIMMER M.D., ATTENDING RADIOLOGIST\par This document has been electronically signed. Dec 3 2018 1:59PM\par  \par \par  \par \par

## 2019-04-29 NOTE — DISCUSSION/SUMMARY
[Patient] : the patient [___ Week(s)] : [unfilled] week(s) [FreeTextEntry1] : 54 y/o female with h/o htn, hl, predm, family h/o early cvd, gerd, overweight, cad who presents for f/up of cp s/p LISA pLAD\par \par -CTA 9/18 with severe stenosis noncalcified plaque mid LAD\par -Echo 9/18: normal lv size/thickness, no wma, ef 60-65%, no valvular disease\par -continue asa and lipitor \par -cath 9/20/18 LM nl, pLAD 80% s/p PROMUS LISA, Lcx/Rca normal, ef 65%\par bifurcation, Successful LISA prox LAD, LCx & RCA normal, EF normal. \par -ekg 11/18 - SB, normal intervals, no st/t changes\par -labs 9/18 reviewed\par -CAIN w doppler 12/18: normal\par -counseled on cvd risk factors\par -close monitoring of bp - increase to norvasc 5 and toprol 75\par -f/up 3 weeks for bp\par

## 2019-06-10 ENCOUNTER — APPOINTMENT (OUTPATIENT)
Dept: HEART AND VASCULAR | Facility: CLINIC | Age: 54
End: 2019-06-10
Payer: COMMERCIAL

## 2019-06-10 ENCOUNTER — NON-APPOINTMENT (OUTPATIENT)
Age: 54
End: 2019-06-10

## 2019-06-10 VITALS
BODY MASS INDEX: 27.79 KG/M2 | HEART RATE: 59 BPM | DIASTOLIC BLOOD PRESSURE: 72 MMHG | OXYGEN SATURATION: 99 % | SYSTOLIC BLOOD PRESSURE: 110 MMHG | HEIGHT: 62 IN | WEIGHT: 151 LBS

## 2019-06-10 PROCEDURE — 99213 OFFICE O/P EST LOW 20 MIN: CPT | Mod: 25

## 2019-06-10 PROCEDURE — 93000 ELECTROCARDIOGRAM COMPLETE: CPT

## 2019-06-10 NOTE — HISTORY OF PRESENT ILLNESS
[FreeTextEntry1] : 52 y/o female with h/o htn, hl, predm, family h/o early cvd, overweight, gerd who presents for f/up\par \par Last visit started norvasc and toprol increased\par \par Seen for cp and had CTA  with severe stenosis noncalcified plaque mid LAD\par Echo : normal lv size/thickness, no wma, ef 60-65%, no valvular disease\par \par \par \par Sent for cath 18 LM nl, pLAD 80% FFR 0.75 - s/p LISA Promus pLAD, normal Lcx/RCA, ef 65%, no as/mr, edp 11\par bifurcation, Successful LISA prox LAD, LCx & RCA normal, EF normal. \par \par CAIN w doppler:  - normal\par \par No cp, sob, palpitations, syncope, edema, orthopnea, pnd\par Mild sob with stairs and brief atypical cp\par \par Stress level high\par \par \par HTN diagnosed \par HL diagnosed \par \par Had 1 ectopic and 2 miscarriages\par \par Started menopause age 45\par \par \par \par \par PMH/PSH:\par htn\par hl\par overweight\par predm\par c spine surgery \par fibroid surgery \par tonsillectomy 1969\par gerd\par rosacea\par cad s/p LISA pLAD \par \par \par \par ALL:\par pcn - facial/throat swelling\par eggs\par \par \par SH:\par no tobacco\par social etoh\par no drugs\par from [place]\par \par no kids\par 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 \par \par MEDS:\par nexium qd\par asa 81 mg qd\par plavix 75 mg qd\par lipitor 40 mg qhs\par toprol 75 mg qd\par omeprazole\par metronidazole topical .75% bid\par norvasc 5 mg qd\par \par \par \par FH:\par mother - lupus,  [place] 74 (CVA - 44)\par father - dm,  57 from [place]\par sister -  pancreatic cancer 67\par brother -  Alzheimers 63\par brother - alive, 65, healthy\par brother - alive, 49, healthy \par \par \par EXAM: US RETROPERITONEAL COMPLETE \par \par EXAM: US DPLX PELVIC \par \par PROCEDURE DATE: 2018 \par \par \par \par INTERPRETATION: RENAL and BLADDER ULTRASOUND and RENAL ARTERIAL DUPLEX \par DOPPLER dated 12/3/2018 12:23 PM\par \par INDICATION: Assess for renal artery stenosis. History of essential \par hypertension.\par \par PRIOR STUDIES: None.\par \par TECHNIQUE: Ultrasound evaluation of the kidneys was performed. Duplex \par Doppler evaluation of the renal arteries was performed bilaterally \par utilizing grayscale imaging, color flow Doppler, and spectral Doppler \par waveform analysis. \par \par RIGHT KIDNEY:\par Length: 10.7 cm\par Lesions: None\par Hydronephrosis: None\par Stones: None\par Echogenicity: Normal\par \par RIGHT RENAL ARTERIAL DOPPLER:\par Main renal artery maximum peak systolic velocity: 101 cm/sec, end \par diastolic volume 28.2 cm/sec.\par Segmental renal artery resistive indices: Normal (< 0.8)\par Segmental renal artery acceleration times: Normal (< 70 msec)\par Segmental renal artery acceleration indices: Abnormal (> 300 cm/sec2)\par \par LEFT KIDNEY:\par Length: 10.6 cm\par Lesions: None\par Hydronephrosis: None\par Stones: None\par Echogenicity: Normal\par \par LEFT RENAL ARTERIAL DOPPLER:\par Main renal artery maximum peak systolic velocity: 59.8 cm/sec, \par end-diastolic volume 14.7 cm/sec\par Segmental renal artery resistive indices: Normal (< 0.8)\par Segmental renal artery acceleration times: Normal (< 70 msec)\par Segmental renal artery acceleration indices: Normal (> 300 cm/sec2)\par \par ABDOMINAL AORTA:\par Maximum peak systolic velocity: 96.3 cm/sec.\par \par URINARY BLADDER:\par Ureteral jets: Both visible.\par Filling defects: None\par Bladder volume: Pre-void: 276 ml; Post-void: 13 ml.\par \par \par IMPRESSION:\par No evidence of stenosis or abnormal flow of the renal arteries \par bilaterally with normal appearing kidneys. Abnormal segmental right renal \par artery acceleration indices which is nonspecific. \par \par \par \par \par \par "Thank you for the opportunity to participate in the care of this \par patient."\par \par MUSA FOWLER M.D., RADIOLOGY RESIDENT\par This document has been electronically signed.\par WALTER ZIMMER M.D., ATTENDING RADIOLOGIST\par This document has been electronically signed. Dec 3 2018 1:59PM\par  \par \par  \par \par

## 2019-06-10 NOTE — DISCUSSION/SUMMARY
[___ Month(s)] : [unfilled] month(s) [Patient] : the patient [FreeTextEntry1] : 54 y/o female with h/o htn, hl, predm, family h/o early cvd, gerd, overweight, cad who presents for f/up of cp s/p LISA pLAD\par \par -CTA 9/18 with severe stenosis noncalcified plaque mid LAD\par -Echo 9/18: normal lv size/thickness, no wma, ef 60-65%, no valvular disease\par -continue asa and lipitor \par -cath 9/20/18 LM nl, pLAD 80% s/p PROMUS LISA, Lcx/Rca normal, ef 65%\par bifurcation, Successful LISA prox LAD, LCx & RCA normal, EF normal. \par -ordered ekg today - SB, normal intervals, no st/t changes\par -labs 9/18 reviewed\par -CAIN w doppler 12/18: normal\par -counseled on cvd risk factors\par -continue bp meds\par -f/up 4 months for labs/bp\par

## 2019-06-10 NOTE — PHYSICAL EXAM
[General Appearance - Well Developed] : well developed [General Appearance - In No Acute Distress] : no acute distress [General Appearance - Well Nourished] : well nourished

## 2019-06-24 ENCOUNTER — RX RENEWAL (OUTPATIENT)
Age: 54
End: 2019-06-24

## 2019-06-25 ENCOUNTER — TRANSCRIPTION ENCOUNTER (OUTPATIENT)
Age: 54
End: 2019-06-25

## 2019-06-27 ENCOUNTER — RX RENEWAL (OUTPATIENT)
Age: 54
End: 2019-06-27

## 2019-07-11 ENCOUNTER — NON-APPOINTMENT (OUTPATIENT)
Age: 54
End: 2019-07-11

## 2019-07-11 ENCOUNTER — APPOINTMENT (OUTPATIENT)
Dept: HEART AND VASCULAR | Facility: CLINIC | Age: 54
End: 2019-07-11
Payer: COMMERCIAL

## 2019-07-11 VITALS
DIASTOLIC BLOOD PRESSURE: 82 MMHG | WEIGHT: 153 LBS | SYSTOLIC BLOOD PRESSURE: 124 MMHG | HEART RATE: 63 BPM | HEIGHT: 62 IN | OXYGEN SATURATION: 98 % | BODY MASS INDEX: 28.16 KG/M2

## 2019-07-11 DIAGNOSIS — R60.0 LOCALIZED EDEMA: ICD-10-CM

## 2019-07-11 PROCEDURE — 81005 URINALYSIS: CPT

## 2019-07-11 PROCEDURE — 99214 OFFICE O/P EST MOD 30 MIN: CPT | Mod: 25

## 2019-07-11 PROCEDURE — 36415 COLL VENOUS BLD VENIPUNCTURE: CPT

## 2019-07-11 PROCEDURE — 93000 ELECTROCARDIOGRAM COMPLETE: CPT

## 2019-07-11 PROCEDURE — 82043 UR ALBUMIN QUANTITATIVE: CPT | Mod: QW

## 2019-07-11 RX ORDER — AMLODIPINE BESYLATE 5 MG/1
5 TABLET ORAL
Qty: 90 | Refills: 1 | Status: DISCONTINUED | COMMUNITY
Start: 2019-03-11 | End: 2019-07-11

## 2019-07-11 NOTE — DISCUSSION/SUMMARY
[Patient] : the patient [___ Week(s)] : [unfilled] week(s) [FreeTextEntry1] : 55 y/o female with h/o htn, hl, predm, family h/o early cvd, gerd, overweight, cad who presents for f/up of cp s/p LISA pLAD\par \par -ordered CTA and Echo\par -ordered ekg today - SB, normal intervals, no st/t changes\par -ordered labs today\par -ordered KASSANDRA\par -CTA 9/18 with severe stenosis noncalcified plaque mid LAD\par -Echo 9/18: normal lv size/thickness, no wma, ef 60-65%, no valvular disease\par -continue asa and lipitor \par -cath 9/20/18 LM nl, pLAD 80% s/p PROMUS LISA, Lcx/Rca normal, ef 65%\par bifurcation, Successful LISA prox LAD, LCx & RCA normal, EF normal. \par -ekg 6/19 - SB, normal intervals, no st/t changes\par -labs 9/18 reviewed\par -CAIN w doppler 12/18: normal\par -counseled on cvd risk factors\par -continue bp meds - dc norvasc and start lisinopril 5 mg qd\par -f/up 2-3 weeks for bp/bmp

## 2019-07-11 NOTE — PHYSICAL EXAM
[General Appearance - Well Nourished] : well nourished [General Appearance - Well Developed] : well developed [General Appearance - In No Acute Distress] : no acute distress

## 2019-07-11 NOTE — HISTORY OF PRESENT ILLNESS
[FreeTextEntry1] : 52 y/o female with h/o htn, hl, predm, family h/o early cvd, overweight, gerd who presents for f/up\par \par Just seen \par Now notes new le edema (norvasc started  and uptitrated) while away on cruise vacation in [place]\par Swelling has mostly resolved. Was bilateral, no erythema, mild pain back of left leg\par Also continues to note some chest pain/left arm pain and [place].\par \par Seen for cp and had CTA  with severe stenosis noncalcified plaque mid LAD\par Echo : normal lv size/thickness, no wma, ef 60-65%, no valvular disease\par \par \par \par Sent for cath 18 LM nl, pLAD 80% FFR 0.75 - s/p LISA Promus pLAD, normal Lcx/RCA, ef 65%, no as/mr, edp 11\par bifurcation, Successful LISA prox LAD, LCx & RCA normal, EF normal. \par \par CAIN w doppler:  - normal\par \par No cp, sob, palpitations, syncope, edema, orthopnea, pnd\par Mild sob with stairs and brief atypical cp\par \par Stress level high\par \par \par HTN diagnosed \par HL diagnosed \par \par Had 1 ectopic and 2 miscarriages\par 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 \par \par MEDS:\par nexium qd\par asa 81 mg qd\par plavix 75 mg qd\par lipitor 40 mg qhs\par toprol 75 mg qd\par omeprazole\par metronidazole topical .75% bid\par norvasc 5 mg qd\par \par \par \par FH:\par mother - lupus,  [place] 74 (CVA - 44)\par father - dm,  57 from [place]\par sister -  pancreatic cancer 67\par brother -  Alzheimers 63\par brother - alive, 65, healthy\par brother - alive, 49, healthy \par \par \par EXAM: US RETROPERITONEAL COMPLETE \par \par EXAM: US DPLX PELVIC \par \par PROCEDURE DATE: 2018 \par \par \par \par INTERPRETATION: RENAL and BLADDER ULTRASOUND and RENAL ARTERIAL DUPLEX \par DOPPLER dated 12/3/2018 12:23 PM\par \par INDICATION: Assess for renal artery stenosis. History of essential \par hypertension.\par \par PRIOR STUDIES: None.\par \par TECHNIQUE: Ultrasound evaluation of the kidneys was performed. Duplex \par Doppler evaluation of the renal arteries was performed bilaterally \par utilizing grayscale imaging, color flow Doppler, and spectral Doppler \par waveform analysis. \par \par RIGHT KIDNEY:\par Length: 10.7 cm\par Lesions: None\par Hydronephrosis: None\par Stones: None\par Echogenicity: Normal\par \par RIGHT RENAL ARTERIAL DOPPLER:\par Main renal artery maximum peak systolic velocity: 101 cm/sec, end \par diastolic volume 28.2 cm/sec.\par Segmental renal artery resistive indices: Normal (< 0.8)\par Segmental renal artery acceleration times: Normal (< 70 msec)\par Segmental renal artery acceleration indices: Abnormal (> 300 cm/sec2)\par \par LEFT KIDNEY:\par Length: 10.6 cm\par Lesions: None\par Hydronephrosis: None\par Stones: None\par Echogenicity: Normal\par \par LEFT RENAL ARTERIAL DOPPLER:\par Main renal artery maximum peak systolic velocity: 59.8 cm/sec, \par end-diastolic volume 14.7 cm/sec\par Segmental renal artery resistive indices: Normal (< 0.8)\par Segmental renal artery acceleration times: Normal (< 70 msec)\par Segmental renal artery acceleration indices: Normal (> 300 cm/sec2)\par \par ABDOMINAL AORTA:\par Maximum peak systolic velocity: 96.3 cm/sec.\par \par URINARY BLADDER:\par Ureteral jets: Both visible.\par Filling defects: None\par Bladder volume: Pre-void: 276 ml; Post-void: 13 ml.\par \par \par IMPRESSION:\par No evidence of stenosis or abnormal flow of the renal arteries \par bilaterally with normal appearing kidneys. Abnormal segmental right renal \par artery acceleration indices which is nonspecific. \par \par \par \par \par \par "Thank you for the opportunity to participate in the care of this \par patient."\par \par MUSA FOWLER M.D., RADIOLOGY RESIDENT\par This document has been electronically signed.\par WALTER ZIMMER M.D., ATTENDING RADIOLOGIST\par This document has been electronically signed. Dec 3 2018 1:59PM\par  \par

## 2019-07-11 NOTE — REVIEW OF SYSTEMS
[Lower Ext Edema] : lower extremity edema [Negative] : Heme/Lymph [Shortness Of Breath] : shortness of breath [Chest Pain] : chest pain

## 2019-07-12 LAB
ALBUMIN SERPL ELPH-MCNC: 4.6 G/DL
ALP BLD-CCNC: 92 U/L
ALT SERPL-CCNC: 18 U/L
ANION GAP SERPL CALC-SCNC: 12 MMOL/L
APPEARANCE: CLEAR
AST SERPL-CCNC: 21 U/L
BACTERIA: NEGATIVE
BASOPHILS # BLD AUTO: 0.02 K/UL
BASOPHILS NFR BLD AUTO: 0.4 %
BILIRUB SERPL-MCNC: 0.4 MG/DL
BILIRUBIN URINE: NEGATIVE
BLOOD URINE: NEGATIVE
BUN SERPL-MCNC: 9 MG/DL
CALCIUM SERPL-MCNC: 9.6 MG/DL
CHLORIDE SERPL-SCNC: 106 MMOL/L
CHOLEST SERPL-MCNC: 136 MG/DL
CHOLEST/HDLC SERPL: 2.6 RATIO
CO2 SERPL-SCNC: 25 MMOL/L
COLOR: NORMAL
CREAT SERPL-MCNC: 0.68 MG/DL
CREAT SPEC-SCNC: 58 MG/DL
DEPRECATED D DIMER PPP IA-ACNC: <150 NG/ML DDU
EOSINOPHIL # BLD AUTO: 0.14 K/UL
EOSINOPHIL NFR BLD AUTO: 2.5 %
ESTIMATED AVERAGE GLUCOSE: 120 MG/DL
GLUCOSE QUALITATIVE U: NEGATIVE
GLUCOSE SERPL-MCNC: 101 MG/DL
HBA1C MFR BLD HPLC: 5.8 %
HCT VFR BLD CALC: 39 %
HDLC SERPL-MCNC: 52 MG/DL
HGB BLD-MCNC: 12.6 G/DL
HYALINE CASTS: 0 /LPF
IMM GRANULOCYTES NFR BLD AUTO: 0.2 %
KETONES URINE: NEGATIVE
LDLC SERPL CALC-MCNC: 67 MG/DL
LEUKOCYTE ESTERASE URINE: NEGATIVE
LYMPHOCYTES # BLD AUTO: 1.78 K/UL
LYMPHOCYTES NFR BLD AUTO: 31.2 %
MAGNESIUM SERPL-MCNC: 1.8 MG/DL
MAN DIFF?: NORMAL
MCHC RBC-ENTMCNC: 31.6 PG
MCHC RBC-ENTMCNC: 32.3 GM/DL
MCV RBC AUTO: 97.7 FL
MICROALBUMIN 24H UR DL<=1MG/L-MCNC: <1.2 MG/DL
MICROALBUMIN/CREAT 24H UR-RTO: NORMAL MG/G
MICROSCOPIC-UA: NORMAL
MONOCYTES # BLD AUTO: 0.41 K/UL
MONOCYTES NFR BLD AUTO: 7.2 %
NEUTROPHILS # BLD AUTO: 3.35 K/UL
NEUTROPHILS NFR BLD AUTO: 58.5 %
NITRITE URINE: NEGATIVE
NT-PROBNP SERPL-MCNC: 84 PG/ML
PH URINE: 8
PLATELET # BLD AUTO: 244 K/UL
POTASSIUM SERPL-SCNC: 4.4 MMOL/L
PROT SERPL-MCNC: 6.9 G/DL
PROTEIN URINE: NEGATIVE
RBC # BLD: 3.99 M/UL
RBC # FLD: 12.9 %
RED BLOOD CELLS URINE: 1 /HPF
SODIUM SERPL-SCNC: 143 MMOL/L
SPECIFIC GRAVITY URINE: 1.01
SQUAMOUS EPITHELIAL CELLS: 1 /HPF
TRIGL SERPL-MCNC: 84 MG/DL
TSH SERPL-ACNC: 1.73 UIU/ML
UROBILINOGEN URINE: NORMAL
WBC # FLD AUTO: 5.71 K/UL
WHITE BLOOD CELLS URINE: 0 /HPF

## 2019-07-18 ENCOUNTER — RX RENEWAL (OUTPATIENT)
Age: 54
End: 2019-07-18

## 2019-08-01 ENCOUNTER — APPOINTMENT (OUTPATIENT)
Dept: HEART AND VASCULAR | Facility: CLINIC | Age: 54
End: 2019-08-01

## 2019-08-15 ENCOUNTER — FORM ENCOUNTER (OUTPATIENT)
Age: 54
End: 2019-08-15

## 2019-08-16 ENCOUNTER — APPOINTMENT (OUTPATIENT)
Dept: CT IMAGING | Facility: HOSPITAL | Age: 54
End: 2019-08-16
Payer: COMMERCIAL

## 2019-08-16 ENCOUNTER — APPOINTMENT (OUTPATIENT)
Dept: ULTRASOUND IMAGING | Facility: HOSPITAL | Age: 54
End: 2019-08-16
Payer: COMMERCIAL

## 2019-08-16 ENCOUNTER — OUTPATIENT (OUTPATIENT)
Dept: OUTPATIENT SERVICES | Facility: HOSPITAL | Age: 54
LOS: 1 days | End: 2019-08-16
Payer: COMMERCIAL

## 2019-08-16 DIAGNOSIS — Z90.89 ACQUIRED ABSENCE OF OTHER ORGANS: Chronic | ICD-10-CM

## 2019-08-16 DIAGNOSIS — Z98.89 OTHER SPECIFIED POSTPROCEDURAL STATES: Chronic | ICD-10-CM

## 2019-08-16 DIAGNOSIS — D25.9 LEIOMYOMA OF UTERUS, UNSPECIFIED: Chronic | ICD-10-CM

## 2019-08-16 DIAGNOSIS — Z90.49 ACQUIRED ABSENCE OF OTHER SPECIFIED PARTS OF DIGESTIVE TRACT: Chronic | ICD-10-CM

## 2019-08-16 PROCEDURE — 93970 EXTREMITY STUDY: CPT | Mod: 26

## 2019-08-16 PROCEDURE — 93306 TTE W/DOPPLER COMPLETE: CPT | Mod: 26

## 2019-08-16 PROCEDURE — 75574 CT ANGIO HRT W/3D IMAGE: CPT

## 2019-08-16 PROCEDURE — 75574 CT ANGIO HRT W/3D IMAGE: CPT | Mod: 26

## 2019-08-16 PROCEDURE — 93306 TTE W/DOPPLER COMPLETE: CPT

## 2019-08-16 PROCEDURE — 93970 EXTREMITY STUDY: CPT

## 2019-09-09 ENCOUNTER — APPOINTMENT (OUTPATIENT)
Dept: HEART AND VASCULAR | Facility: CLINIC | Age: 54
End: 2019-09-09
Payer: COMMERCIAL

## 2019-09-09 VITALS
WEIGHT: 156 LBS | HEIGHT: 62 IN | OXYGEN SATURATION: 99 % | DIASTOLIC BLOOD PRESSURE: 88 MMHG | SYSTOLIC BLOOD PRESSURE: 160 MMHG | HEART RATE: 60 BPM | BODY MASS INDEX: 28.71 KG/M2

## 2019-09-09 PROCEDURE — 99213 OFFICE O/P EST LOW 20 MIN: CPT

## 2019-09-09 RX ORDER — LISINOPRIL 5 MG/1
5 TABLET ORAL DAILY
Qty: 30 | Refills: 3 | Status: DISCONTINUED | COMMUNITY
Start: 2019-07-11 | End: 2019-09-09

## 2019-09-09 NOTE — HISTORY OF PRESENT ILLNESS
[FreeTextEntry1] : \par \par 53 y/o female with h/o htn, hl, predm, family h/o early cvd, overweight, gerd who presents for f/up\par \par Last seen  with c/o edema, cp, romero\par \par No cp, sob, palpitations, syncope, edema, orthopnea, pnd\par \par Norvasc dc'd due to concern over edema and lisinopril 5 mg daily started\par Notes a cough on lisinopril, resolution of edema\par HR noted to be low on day of echo and BB dose decreased to 50 daily\par \par \par CTA : patent mid LAD stent, prox/mid LCx minimal stenosis, RCA normal\par \par Echo : normal lv size/thickness, ef 65%, trace pr/mr/tr, normal pap, normal ivc\par \par KASSANDRA: : neg for dvt\par \par \par Seen for cp and had CTA  with severe stenosis noncalcified plaque mid LAD\par \par \par Sent for cath 18 LM nl, pLAD 80% FFR 0.75 - s/p LISA Promus pLAD, normal Lcx/RCA, ef 65%, no as/mr, edp 11\par bifurcation, Successful LISA prox LAD, LCx & RCA normal, EF normal.\par \par Echo : normal lv size/thickness, no wma, ef 60-65%, no valvular disease\par \par  \par \par CAIN w doppler:  - normal\par \par \par Stress level high\par \par \par HTN diagnosed \par HL diagnosed \par \par Had 1 ectopic and 2 miscarriages\par \par Started menopause age 45\par \par \par \par \par PMH/PSH:\par htn\par hl\par overweight\par predm\par c spine surgery \par fibroid surgery \par tonsillectomy 1969\par gerd\par rosacea\par cad s/p LISA pLAD \par \par \par \par ALL:\par pcn - facial/throat swelling\par eggs\par \par \par SH:\par no tobacco\par social etoh\par no drugs\par from [place]\par \par no kids\par 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

## 2019-09-09 NOTE — DISCUSSION/SUMMARY
[Patient] : the patient [___ Week(s)] : [unfilled] week(s) [FreeTextEntry1] : \par \par 53 y/o female with h/o htn, hl, predm, family h/o early cvd, gerd, overweight, cad who presents for f/up of cp s/p LISA pLAD\par \par -dc ACE due to cough and start losartan 75 mg qd\par -continue metoprolol 50 qd\par -CTA 8/19: patent mid LAD stent, prox/mid LCx minimal stenosis, RCA normal\par -Echo 8/19: normal lv size/thickness, ef 65%, trace pr/mr/tr, normal pap, normal ivc\par -KASSANDRA: 8/19: neg for dvt-\par -ekg 7/19 - SB, normal intervals, no st/t changes\par -labs 2019 reviewed\par -CTA 9/18 with severe stenosis noncalcified plaque mid LAD\par -Echo 9/18: normal lv size/thickness, no wma, ef 60-65%, no valvular disease\par -continue asa, plavix and lipitor \par -cath 9/20/18 LM nl, pLAD 80% s/p PROMUS LISA, Lcx/Rca normal, ef 65%\par bifurcation, Successful LISA prox LAD, LCx & RCA normal, EF normal. \par -CAIN w doppler 12/18: normal\par -counseled on cvd risk factors\par -f/up 2-3 weeks for bp/bmp.

## 2019-09-24 ENCOUNTER — TRANSCRIPTION ENCOUNTER (OUTPATIENT)
Age: 54
End: 2019-09-24

## 2019-09-26 ENCOUNTER — TRANSCRIPTION ENCOUNTER (OUTPATIENT)
Age: 54
End: 2019-09-26

## 2019-10-07 ENCOUNTER — APPOINTMENT (OUTPATIENT)
Dept: HEART AND VASCULAR | Facility: CLINIC | Age: 54
End: 2019-10-07
Payer: COMMERCIAL

## 2019-10-07 ENCOUNTER — LABORATORY RESULT (OUTPATIENT)
Age: 54
End: 2019-10-07

## 2019-10-07 VITALS
SYSTOLIC BLOOD PRESSURE: 130 MMHG | HEART RATE: 59 BPM | DIASTOLIC BLOOD PRESSURE: 80 MMHG | WEIGHT: 156 LBS | HEIGHT: 62 IN | BODY MASS INDEX: 28.71 KG/M2

## 2019-10-07 PROCEDURE — 99213 OFFICE O/P EST LOW 20 MIN: CPT | Mod: 25

## 2019-10-07 PROCEDURE — 36415 COLL VENOUS BLD VENIPUNCTURE: CPT

## 2019-10-07 NOTE — HISTORY OF PRESENT ILLNESS
[FreeTextEntry1] : 55 y/o female with h/o htn, hl, predm, family h/o early cvd, overweight, gerd who presents for f/up\par \par Last seen \par \par No cp, sob, palpitations, syncope, edema, orthopnea, pnd\par \par Norvasc dc'd due to concern over edema and lisinopril 5 mg daily started\par Noted a cough on lisinopril, do changed to losartan\par HR noted to be low on day of echo and BB dose decreased to 50 daily\par \par Holter : avg hr 62, 27 VE, 13 SVE\par \par \par CTA : patent mid LAD stent, prox/mid LCx minimal stenosis, RCA normal\par \par Echo : normal lv size/thickness, ef 65%, trace pr/mr/tr, normal pap, normal ivc\par \par KASSANDRA: : neg for dvt\par \par \par Seen for cp and had CTA  with severe stenosis noncalcified plaque mid LAD\par \par \par Sent for cath 18 LM nl, pLAD 80% FFR 0.75 - s/p LISA Promus pLAD, normal Lcx/RCA, ef 65%, no as/mr, edp 11\par bifurcation, Successful LISA prox LAD, LCx & RCA normal, EF normal.\par \par Echo : normal lv size/thickness, no wma, ef 60-65%, no valvular disease\par \par  \par \par CAIN w doppler:  - normal\par \par \par Stress level high\par \par \par HTN diagnosed \par HL diagnosed \par \par Had 1 ectopic and 2 miscarriages\par \par Started menopause age 45\par \par \par \par \par PMH/PSH:\par htn\par hl\par overweight\par predm\par c spine surgery \par fibroid surgery \par tonsillectomy 1969\par gerd\par rosacea\par cad s/p LISA pLAD \par \par \par \par ALL:\par pcn - facial/throat swelling\par eggs\par \par \par SH:\par no tobacco\par social etoh\par no drugs\par from [place]\par \par no kids\par 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[employer]\par \par \par MEDS:\par nexium qd\par asa 81 mg qd\par plavix 75 mg qd\par lipitor 40 mg qhs\par toprol 50 mg qd\par omeprazole\par metronidazole topical .75% bid\par losartan 75 mg qd\par \par \par \par FH:\par mother - lupus,  [place] 74 (CVA - 44)\par father - dm,  57 from [place]\par sister -  pancreatic cancer 67\par brother -  Alzheimers 63\par brother - alive, 65, healthy\par brother - alive, 49, healthy \par \par

## 2019-10-07 NOTE — DISCUSSION/SUMMARY
[Patient] : the patient [___ Month(s)] : [unfilled] month(s) [FreeTextEntry1] : 55 y/o female with h/o htn, hl, predm, family h/o early cvd, gerd, overweight, cad who presents for f/up of cp s/p LISA pLAD\par \par -continue losartan 75 mg qd\par -continue metoprolol 50 qd\par -holter 9/19: avg hr 62, 27 VE, 13 SVE\par -CTA 8/19: patent mid LAD stent, prox/mid LCx minimal stenosis, RCA normal\par -Echo 8/19: normal lv size/thickness, ef 65%, trace pr/mr/tr, normal pap, normal ivc\par -KASSANDRA: 8/19: neg for dvt-\par -ekg 7/19 - SB, normal intervals, no st/t changes\par -labs 2019 reviewed\par -CTA 9/18 with severe stenosis noncalcified plaque mid LAD\par -Echo 9/18: normal lv size/thickness, no wma, ef 60-65%, no valvular disease\par -continue asa, plavix and lipitor \par -cath 9/20/18 LM nl, pLAD 80% s/p PROMUS LISA, Lcx/Rca normal, ef 65%\par bifurcation, Successful LISA prox LAD, LCx & RCA normal, EF normal. \par -CAIN w doppler 12/18: normal\par -counseled on cvd risk factors\par -ordered bmp today\par -f/up 4-6 months\par

## 2019-10-08 LAB
ANION GAP SERPL CALC-SCNC: 13 MMOL/L
BUN SERPL-MCNC: 12 MG/DL
CALCIUM SERPL-MCNC: 9.8 MG/DL
CHLORIDE SERPL-SCNC: 102 MMOL/L
CO2 SERPL-SCNC: 26 MMOL/L
CREAT SERPL-MCNC: 0.72 MG/DL
GLUCOSE SERPL-MCNC: 89 MG/DL
POTASSIUM SERPL-SCNC: 4.6 MMOL/L
SODIUM SERPL-SCNC: 141 MMOL/L

## 2019-10-31 RX ORDER — LOSARTAN POTASSIUM 50 MG/1
50 TABLET, FILM COATED ORAL
Qty: 45 | Refills: 3 | Status: DISCONTINUED | COMMUNITY
Start: 2019-09-09 | End: 2019-10-31

## 2019-11-04 ENCOUNTER — TRANSCRIPTION ENCOUNTER (OUTPATIENT)
Age: 54
End: 2019-11-04

## 2019-11-04 RX ORDER — LOSARTAN POTASSIUM 50 MG/1
50 TABLET, FILM COATED ORAL DAILY
Qty: 45 | Refills: 5 | Status: DISCONTINUED | COMMUNITY
Start: 2019-10-31 | End: 2019-11-04

## 2019-12-04 ENCOUNTER — RX RENEWAL (OUTPATIENT)
Age: 54
End: 2019-12-04

## 2019-12-12 ENCOUNTER — RX RENEWAL (OUTPATIENT)
Age: 54
End: 2019-12-12

## 2020-03-05 ENCOUNTER — NON-APPOINTMENT (OUTPATIENT)
Age: 55
End: 2020-03-05

## 2020-03-05 ENCOUNTER — APPOINTMENT (OUTPATIENT)
Dept: HEART AND VASCULAR | Facility: CLINIC | Age: 55
End: 2020-03-05
Payer: COMMERCIAL

## 2020-03-05 VITALS
BODY MASS INDEX: 28.71 KG/M2 | HEIGHT: 62 IN | DIASTOLIC BLOOD PRESSURE: 80 MMHG | OXYGEN SATURATION: 98 % | WEIGHT: 156 LBS | RESPIRATION RATE: 14 BRPM | SYSTOLIC BLOOD PRESSURE: 132 MMHG | HEART RATE: 64 BPM

## 2020-03-05 PROCEDURE — 99214 OFFICE O/P EST MOD 30 MIN: CPT | Mod: 25

## 2020-03-05 PROCEDURE — 93000 ELECTROCARDIOGRAM COMPLETE: CPT

## 2020-03-05 NOTE — DISCUSSION/SUMMARY
[Patient] : the patient [___ Month(s)] : [unfilled] month(s) [FreeTextEntry1] : 53 y/o female with h/o htn, hl, predm, family h/o early cvd, gerd, overweight, cad who presents for f/up of cp s/p LISA pLAD\par \par -continue losartan 75 mg qd\par -continue metoprolol 50 qd\par -holter 9/19: avg hr 62, 27 VE, 13 SVE\par -CTA 8/19: patent mid LAD stent, prox/mid LCx minimal stenosis, RCA normal\par -Echo 8/19: normal lv size/thickness, ef 65%, trace pr/mr/tr, normal pap, normal ivc\par -KASSANDRA: 8/19: neg for dvt-\par -ordered ekg today - SB, normal intervals, no st/t changes\par -labs 2019 reviewed\par -CTA 9/18 with severe stenosis noncalcified plaque mid LAD\par -Echo 9/18: normal lv size/thickness, no wma, ef 60-65%, no valvular disease\par -continue asa, plavix and lipitor \par -cath 9/20/18 LM nl, pLAD 80% s/p PROMUS LISA, Lcx/Rca normal, ef 65%\par bifurcation, Successful LISA prox LAD, LCx & RCA normal, EF normal. \par -CAIN w doppler 12/18: normal\par -counseled on cvd risk factors\par -f/up 6 months\par

## 2020-03-05 NOTE — HISTORY OF PRESENT ILLNESS
[FreeTextEntry1] : 55 y/o female with h/o htn, hl, predm, family h/o early cvd, overweight, gerd who presents for f/up\par \par Last seen 10/19\par \par No cp, sob, palpitations, syncope, edema, orthopnea, pnd\par \par Norvasc dc'd due to concern over edema and lisinopril 5 mg daily started\par Noted a cough on lisinopril, so changed to losartan\par HR noted to be low on day of echo and BB dose decreased to 50 daily\par \par Holter : avg hr 62, 27 VE, 13 SVE\par \par \par CTA : patent mid LAD stent, prox/mid LCx minimal stenosis, RCA normal\par \par Echo : normal lv size/thickness, ef 65%, trace pr/mr/tr, normal pap, normal ivc\par \par KASSANDRA: : neg for dvt\par \par \par Seen for cp and had CTA  with severe stenosis noncalcified plaque mid LAD\par \par \par Sent for cath 18 LM nl, pLAD 80% FFR 0.75 - s/p LISA Promus pLAD, normal Lcx/RCA, ef 65%, no as/mr, edp 11\par bifurcation, Successful LISA prox LAD, LCx & RCA normal, EF normal.\par \par Echo : normal lv size/thickness, no wma, ef 60-65%, no valvular disease\par \par  \par \par CAIN w doppler:  - normal\par \par \par Stress level high\par \par \par HTN diagnosed \par HL diagnosed \par \par Had 1 ectopic and 2 miscarriages\par \par Started menopause age 45\par \par \par \par \par PMH/PSH:\par htn\par hl\par overweight\par predm\par c spine surgery \par fibroid surgery \par tonsillectomy 1969\par gerd\par rosacea\par cad s/p LISA pLAD \par \par \par \par ALL:\par pcn - facial/throat swelling\par eggs\par \par \par SH:\par no tobacco\par social etoh\par no drugs\par from [place]\par \par no kids\par 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

## 2020-03-09 ENCOUNTER — TRANSCRIPTION ENCOUNTER (OUTPATIENT)
Age: 55
End: 2020-03-09

## 2020-03-12 RX ORDER — LOSARTAN POTASSIUM 25 MG/1
25 TABLET, FILM COATED ORAL DAILY
Qty: 270 | Refills: 1 | Status: DISCONTINUED | COMMUNITY
Start: 2019-11-04 | End: 2020-03-12

## 2020-03-23 ENCOUNTER — TRANSCRIPTION ENCOUNTER (OUTPATIENT)
Age: 55
End: 2020-03-23

## 2020-06-04 ENCOUNTER — APPOINTMENT (OUTPATIENT)
Dept: HEART AND VASCULAR | Facility: CLINIC | Age: 55
End: 2020-06-04
Payer: COMMERCIAL

## 2020-06-04 VITALS
OXYGEN SATURATION: 98 % | SYSTOLIC BLOOD PRESSURE: 130 MMHG | DIASTOLIC BLOOD PRESSURE: 86 MMHG | HEIGHT: 62 IN | BODY MASS INDEX: 29.08 KG/M2 | WEIGHT: 158 LBS | HEART RATE: 53 BPM

## 2020-06-04 VITALS — TEMPERATURE: 98.4 F

## 2020-06-04 PROCEDURE — 99213 OFFICE O/P EST LOW 20 MIN: CPT

## 2020-06-04 NOTE — HISTORY OF PRESENT ILLNESS
[FreeTextEntry1] : 53 y/o female with h/o htn, hl, predm, family h/o early cvd, overweight, gerd who presents for f/up\par \par Last seen 3/20\par \par No cp, sob, palpitations, syncope, edema, orthopnea, pnd\par \par Norvasc dc'd due to concern over edema and lisinopril 5 mg daily started\par Noted a cough on lisinopril, so changed to losartan\par HR noted to be low on day of echo and BB dose decreased to 50 daily\par \par Holter : avg hr 62, 27 VE, 13 SVE\par \par \par CTA : patent mid LAD stent, prox/mid LCx minimal stenosis, RCA normal\par \par Echo : normal lv size/thickness, ef 65%, trace pr/mr/tr, normal pap, normal ivc\par \par KASSANDRA: : neg for dvt\par \par \par Seen for cp and had CTA  with severe stenosis noncalcified plaque mid LAD\par \par \par Sent for cath 18 LM nl, pLAD 80% FFR 0.75 - s/p LISA Promus pLAD, normal Lcx/RCA, ef 65%, no as/mr, edp 11\par bifurcation, Successful LISA prox LAD, LCx & RCA normal, EF normal.\par \par Echo : normal lv size/thickness, no wma, ef 60-65%, no valvular disease\par \par  \par \par CAIN w doppler:  - normal\par \par \par Stress level high\par \par \par HTN diagnosed \par HL diagnosed \par \par Had 1 ectopic and 2 miscarriages\par \par Started menopause age 45\par \par \par \par \par PMH/PSH:\par htn\par hl\par overweight\par predm\par c spine surgery \par fibroid surgery \par tonsillectomy 1969\par gerd\par rosacea\par cad s/p LISA pLAD \par \par \par \par ALL:\par pcn - facial/throat swelling\par eggs\par \par \par SH:\par no tobacco\par social etoh\par no drugs\par from [place]\par \par no kids\par 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

## 2020-06-04 NOTE — DISCUSSION/SUMMARY
[Patient] : the patient [___ Month(s)] : [unfilled] month(s) [FreeTextEntry1] : 55 y/o female with h/o htn, hl, predm, family h/o early cvd, gerd, overweight, cad who presents for f/up of cp s/p LISA pLAD\par \par -continue losartan 75 mg qd\par -continue metoprolol 50 qd\par -holter 9/19: avg hr 62, 27 VE, 13 SVE\par -CTA 8/19: patent mid LAD stent, prox/mid LCx minimal stenosis, RCA normal\par -Echo 8/19: normal lv size/thickness, ef 65%, trace pr/mr/tr, normal pap, normal ivc\par -KASSANDRA: 8/19: neg for dvt-\par -ekg 3/20 - SB, normal intervals, no st/t changes\par -labs 2019 reviewed\par -CTA 9/18 with severe stenosis noncalcified plaque mid LAD\par -Echo 9/18: normal lv size/thickness, no wma, ef 60-65%, no valvular disease\par -continue asa, plavix and lipitor \par -cath 9/20/18 LM nl, pLAD 80% s/p PROMUS LISA, Lcx/Rca normal, ef 65%\par bifurcation, Successful LISA prox LAD, LCx & RCA normal, EF normal. \par -CAIN w doppler 12/18: normal\par -counseled on cvd risk factors\par -f/up 6 months\par

## 2020-10-06 NOTE — PATIENT PROFILE ADULT. - URINARY CATHETER
Post-Care Instructions: I reviewed with the patient in detail post-care instructions. Patient is to wear sunprotection, and avoid picking at any of the treated lesions. Pt may apply Vaseline to crusted or scabbing areas. Number Of Freeze-Thaw Cycles: 1 freeze-thaw cycle Include Z78.9 (Other Specified Conditions Influencing Health Status) As An Associated Diagnosis?: No Consent: The patient's consent was obtained including but not limited to risks of crusting, scabbing, blistering, scarring, darker or lighter pigmentary change, recurrence, incomplete removal and infection. Medical Necessity Clause: This procedure was medically necessary because the lesions that were treated were: Medical Necessity Information: It is in your best interest to select a reason for this procedure from the list below. All of these items fulfill various CMS LCD requirements except the new and changing color options. Detail Level: Simple no

## 2020-12-10 ENCOUNTER — APPOINTMENT (OUTPATIENT)
Dept: HEART AND VASCULAR | Facility: CLINIC | Age: 55
End: 2020-12-10
Payer: COMMERCIAL

## 2020-12-10 ENCOUNTER — NON-APPOINTMENT (OUTPATIENT)
Age: 55
End: 2020-12-10

## 2020-12-10 VITALS
HEART RATE: 53 BPM | HEIGHT: 62 IN | BODY MASS INDEX: 29.26 KG/M2 | WEIGHT: 159 LBS | SYSTOLIC BLOOD PRESSURE: 164 MMHG | DIASTOLIC BLOOD PRESSURE: 92 MMHG | TEMPERATURE: 97.8 F

## 2020-12-10 VITALS — SYSTOLIC BLOOD PRESSURE: 150 MMHG | DIASTOLIC BLOOD PRESSURE: 86 MMHG

## 2020-12-10 VITALS — SYSTOLIC BLOOD PRESSURE: 158 MMHG | DIASTOLIC BLOOD PRESSURE: 90 MMHG

## 2020-12-10 PROCEDURE — 36415 COLL VENOUS BLD VENIPUNCTURE: CPT

## 2020-12-10 PROCEDURE — 81005 URINALYSIS: CPT

## 2020-12-10 PROCEDURE — 99214 OFFICE O/P EST MOD 30 MIN: CPT | Mod: 25

## 2020-12-10 PROCEDURE — 99072 ADDL SUPL MATRL&STAF TM PHE: CPT

## 2020-12-10 PROCEDURE — 93000 ELECTROCARDIOGRAM COMPLETE: CPT

## 2020-12-10 PROCEDURE — 82043 UR ALBUMIN QUANTITATIVE: CPT | Mod: QW

## 2020-12-10 NOTE — DISCUSSION/SUMMARY
[Patient] : the patient [___ Week(s)] : [unfilled] week(s) [FreeTextEntry1] : 54 y/o female with h/o htn, hl, predm, family h/o early cvd, gerd, overweight, cad who presents for f/up of cp s/p LISA pLAD\par \par -continue olmesartan 40 qd\par -add hctz 12.5 mg qd\par -continue metoprolol 50 qd\par -holter 9/19: avg hr 62, 27 VE, 13 SVE\par -CTA 8/19: patent mid LAD stent, prox/mid LCx minimal stenosis, RCA normal\par -Echo 8/19: normal lv size/thickness, ef 65%, trace pr/mr/tr, normal pap, normal ivc\par -KASSANDRA: 8/19: neg for dvt\par -ekg ordered today - SB, normal intervals, no st/t changes\par -ekg 3/20 - SB, normal intervals, no st/t changes\par -labs 2019 reviewed\par -CTA 9/18 with severe stenosis noncalcified plaque mid LAD\par -Echo 9/18: normal lv size/thickness, no wma, ef 60-65%, no valvular disease\par -continue asa, plavix and lipitor \par -cath 9/20/18 LM nl, pLAD 80% s/p PROMUS LISA, Lcx/Rca normal, ef 65%\par bifurcation, Successful LISA prox LAD, LCx & RCA normal, EF normal. \par -CAIN w doppler 12/18: normal\par -labs ordered today\par -counseled on cvd risk factors\par -f/up 2-3 weeks for bp, bmp/mg\par

## 2020-12-10 NOTE — HISTORY OF PRESENT ILLNESS
[FreeTextEntry1] : 56 y/o female with h/o htn, hl, predm, family h/o early cvd, overweight, gerd who presents for f/up\par \par Last seen \par \par No cp, sob, palpitations, syncope, edema, orthopnea, pnd\par \par Norvasc dc'd due to concern over edema and lisinopril 5 mg daily started\par Noted a cough on lisinopril, so changed to losartan which was recalled\par then placed on olmesartan\par HR noted to be low on day of echo and BB dose decreased to 50 daily\par \par Holter : avg hr 62, 27 VE, 13 SVE\par \par \par CTA : patent mid LAD stent, prox/mid LCx minimal stenosis, RCA normal\par \par Echo : normal lv size/thickness, ef 65%, trace pr/mr/tr, normal pap, normal ivc\par \par KASSANDRA: : neg for dvt\par \par \par Seen for cp and had CTA  with severe stenosis noncalcified plaque mid LAD\par \par \par Sent for cath 18 LM nl, pLAD 80% FFR 0.75 - s/p LISA Promus pLAD, normal Lcx/RCA, ef 65%, no as/mr, edp 11\par bifurcation, Successful LISA prox LAD, LCx & RCA normal, EF normal.\par \par Echo : normal lv size/thickness, no wma, ef 60-65%, no valvular disease\par \par  \par \par CAIN w doppler:  - normal\par \par \par Stress level high\par \par \par HTN diagnosed \par HL diagnosed \par \par Had 1 ectopic and 2 miscarriages\par 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

## 2020-12-14 LAB
ALBUMIN SERPL ELPH-MCNC: 4.6 G/DL
ALP BLD-CCNC: 97 U/L
ALT SERPL-CCNC: 17 U/L
ANION GAP SERPL CALC-SCNC: 13 MMOL/L
APPEARANCE: CLEAR
AST SERPL-CCNC: 23 U/L
BACTERIA: ABNORMAL
BASOPHILS # BLD AUTO: 0.02 K/UL
BASOPHILS NFR BLD AUTO: 0.4 %
BILIRUB SERPL-MCNC: 0.3 MG/DL
BILIRUBIN URINE: NEGATIVE
BLOOD URINE: NEGATIVE
BUN SERPL-MCNC: 11 MG/DL
CALCIUM SERPL-MCNC: 9.5 MG/DL
CHLORIDE SERPL-SCNC: 106 MMOL/L
CHOLEST SERPL-MCNC: 112 MG/DL
CO2 SERPL-SCNC: 23 MMOL/L
COLOR: NORMAL
CREAT SERPL-MCNC: 0.79 MG/DL
CREAT SPEC-SCNC: 57 MG/DL
EOSINOPHIL # BLD AUTO: 0.1 K/UL
EOSINOPHIL NFR BLD AUTO: 2 %
ESTIMATED AVERAGE GLUCOSE: 120 MG/DL
GLUCOSE QUALITATIVE U: NEGATIVE
GLUCOSE SERPL-MCNC: 89 MG/DL
HBA1C MFR BLD HPLC: 5.8 %
HCT VFR BLD CALC: 38 %
HDLC SERPL-MCNC: 45 MG/DL
HGB BLD-MCNC: 12.1 G/DL
HYALINE CASTS: 0 /LPF
IMM GRANULOCYTES NFR BLD AUTO: 0.2 %
KETONES URINE: NEGATIVE
LDLC SERPL CALC-MCNC: 50 MG/DL
LEUKOCYTE ESTERASE URINE: NEGATIVE
LYMPHOCYTES # BLD AUTO: 1.49 K/UL
LYMPHOCYTES NFR BLD AUTO: 29.2 %
MAGNESIUM SERPL-MCNC: 1.8 MG/DL
MAN DIFF?: NORMAL
MCHC RBC-ENTMCNC: 31.6 PG
MCHC RBC-ENTMCNC: 31.8 GM/DL
MCV RBC AUTO: 99.2 FL
MICROALBUMIN 24H UR DL<=1MG/L-MCNC: <1.2 MG/DL
MICROALBUMIN/CREAT 24H UR-RTO: NORMAL MG/G
MICROSCOPIC-UA: NORMAL
MONOCYTES # BLD AUTO: 0.39 K/UL
MONOCYTES NFR BLD AUTO: 7.6 %
NEUTROPHILS # BLD AUTO: 3.1 K/UL
NEUTROPHILS NFR BLD AUTO: 60.6 %
NITRITE URINE: NEGATIVE
NONHDLC SERPL-MCNC: 67 MG/DL
PH URINE: 7.5
PLATELET # BLD AUTO: 219 K/UL
POTASSIUM SERPL-SCNC: 3.9 MMOL/L
PROT SERPL-MCNC: 6.7 G/DL
PROTEIN URINE: NEGATIVE
RBC # BLD: 3.83 M/UL
RBC # FLD: 12.9 %
RED BLOOD CELLS URINE: 1 /HPF
SARS-COV-2 IGG SERPL IA-ACNC: <0.1 INDEX
SARS-COV-2 IGG SERPL QL IA: NEGATIVE
SODIUM SERPL-SCNC: 142 MMOL/L
SPECIFIC GRAVITY URINE: 1.01
SQUAMOUS EPITHELIAL CELLS: 0 /HPF
TRIGL SERPL-MCNC: 85 MG/DL
TSH SERPL-ACNC: 1.63 UIU/ML
UROBILINOGEN URINE: NORMAL
WBC # FLD AUTO: 5.11 K/UL
WHITE BLOOD CELLS URINE: 0 /HPF

## 2021-01-28 ENCOUNTER — APPOINTMENT (OUTPATIENT)
Dept: HEART AND VASCULAR | Facility: CLINIC | Age: 56
End: 2021-01-28
Payer: COMMERCIAL

## 2021-01-28 VITALS
TEMPERATURE: 97 F | BODY MASS INDEX: 28.72 KG/M2 | SYSTOLIC BLOOD PRESSURE: 118 MMHG | HEART RATE: 74 BPM | DIASTOLIC BLOOD PRESSURE: 84 MMHG | WEIGHT: 157 LBS

## 2021-01-28 VITALS — DIASTOLIC BLOOD PRESSURE: 68 MMHG | SYSTOLIC BLOOD PRESSURE: 104 MMHG

## 2021-01-28 PROCEDURE — 36415 COLL VENOUS BLD VENIPUNCTURE: CPT

## 2021-01-28 PROCEDURE — 99213 OFFICE O/P EST LOW 20 MIN: CPT | Mod: 25

## 2021-01-28 PROCEDURE — 99072 ADDL SUPL MATRL&STAF TM PHE: CPT

## 2021-01-28 NOTE — HISTORY OF PRESENT ILLNESS
[FreeTextEntry1] : 54 y/o female with h/o htn, hl, predm, family h/o early cvd, overweight, gerd who presents for f/up\par \par Last seen \par \par No cp, sob, palpitations, syncope, edema, orthopnea, pnd\par started on hctz last visit\par \par Norvasc dc'd due to concern over edema and lisinopril 5 mg daily started\par Noted a cough on lisinopril, so changed to losartan which was recalled\par then placed on olmesartan\par HR noted to be low on day of echo and BB dose decreased to 50 daily\par \par Holter : avg hr 62, 27 VE, 13 SVE\par \par \par CTA : patent mid LAD stent, prox/mid LCx minimal stenosis, RCA normal\par \par Echo : normal lv size/thickness, ef 65%, trace pr/mr/tr, normal pap, normal ivc\par \par KASSANDRA: : neg for dvt\par \par \par Seen for cp and had CTA  with severe stenosis noncalcified plaque mid LAD\par \par \par Sent for cath 18 LM nl, pLAD 80% FFR 0.75 - s/p LISA Promus pLAD, normal Lcx/RCA, ef 65%, no as/mr, edp 11\par bifurcation, Successful LISA prox LAD, LCx & RCA normal, EF normal.\par \par Echo : normal lv size/thickness, no wma, ef 60-65%, no valvular disease\par \par  \par \par CAIN w doppler:  - normal\par \par \par Stress level high\par \par \par HTN diagnosed \par HL diagnosed \par \par Had 1 ectopic and 2 miscarriages\par \par Started menopause age 45\par \par \par \par \par PMH/PSH:\par htn\par hl\par overweight\par predm\par c spine surgery \par fibroid surgery \par tonsillectomy 1969\par gerd\par rosacea\par cad s/p LISA pLAD \par \par \par \par ALL:\par pcn - facial/throat swelling\par eggs\par \par \par SH:\par no tobacco\par social etoh\par no drugs\par from [place]\par \par no kids\par 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[employer]\par \par \par MEDS:\par nexium qd\par asa 81 mg qd\par plavix 75 mg qd\par lipitor 40 mg qhs\par toprol 50 mg qd\par omeprazole\par metronidazole topical .75% bid\par olmesartan 40 mg qd\par hctz 12.5 mg qd\par \par \par FH:\par mother - lupus,  [place] 74 (CVA - 44)\par father - dm,  57 from [place]\par sister -  pancreatic cancer 67\par brother -  Alzheimers 63\par brother - alive, 65, healthy\par brother - alive, 49, healthy \par

## 2021-02-01 LAB
ANION GAP SERPL CALC-SCNC: 17 MMOL/L
BUN SERPL-MCNC: 17 MG/DL
CALCIUM SERPL-MCNC: 9.9 MG/DL
CHLORIDE SERPL-SCNC: 103 MMOL/L
CO2 SERPL-SCNC: 23 MMOL/L
CREAT SERPL-MCNC: 0.93 MG/DL
GLUCOSE SERPL-MCNC: 85 MG/DL
MAGNESIUM SERPL-MCNC: 1.8 MG/DL
POTASSIUM SERPL-SCNC: 4.3 MMOL/L
SODIUM SERPL-SCNC: 143 MMOL/L

## 2021-03-01 ENCOUNTER — TRANSCRIPTION ENCOUNTER (OUTPATIENT)
Age: 56
End: 2021-03-01

## 2021-08-10 ENCOUNTER — EMERGENCY (EMERGENCY)
Facility: HOSPITAL | Age: 56
LOS: 0 days | Discharge: HOME | End: 2021-08-11
Attending: STUDENT IN AN ORGANIZED HEALTH CARE EDUCATION/TRAINING PROGRAM
Payer: COMMERCIAL

## 2021-08-10 VITALS
HEIGHT: 62 IN | RESPIRATION RATE: 20 BRPM | SYSTOLIC BLOOD PRESSURE: 136 MMHG | DIASTOLIC BLOOD PRESSURE: 69 MMHG | TEMPERATURE: 98 F | HEART RATE: 53 BPM | OXYGEN SATURATION: 97 % | WEIGHT: 162.04 LBS

## 2021-08-10 DIAGNOSIS — D25.9 LEIOMYOMA OF UTERUS, UNSPECIFIED: ICD-10-CM

## 2021-08-10 DIAGNOSIS — Z91.012 ALLERGY TO EGGS: ICD-10-CM

## 2021-08-10 DIAGNOSIS — R11.10 VOMITING, UNSPECIFIED: ICD-10-CM

## 2021-08-10 DIAGNOSIS — Z90.49 ACQUIRED ABSENCE OF OTHER SPECIFIED PARTS OF DIGESTIVE TRACT: ICD-10-CM

## 2021-08-10 DIAGNOSIS — R10.31 RIGHT LOWER QUADRANT PAIN: ICD-10-CM

## 2021-08-10 DIAGNOSIS — Z79.82 LONG TERM (CURRENT) USE OF ASPIRIN: ICD-10-CM

## 2021-08-10 DIAGNOSIS — Z90.49 ACQUIRED ABSENCE OF OTHER SPECIFIED PARTS OF DIGESTIVE TRACT: Chronic | ICD-10-CM

## 2021-08-10 DIAGNOSIS — Z98.89 OTHER SPECIFIED POSTPROCEDURAL STATES: Chronic | ICD-10-CM

## 2021-08-10 DIAGNOSIS — Z90.89 ACQUIRED ABSENCE OF OTHER ORGANS: Chronic | ICD-10-CM

## 2021-08-10 DIAGNOSIS — Z88.0 ALLERGY STATUS TO PENICILLIN: ICD-10-CM

## 2021-08-10 DIAGNOSIS — N20.0 CALCULUS OF KIDNEY: ICD-10-CM

## 2021-08-10 DIAGNOSIS — K21.9 GASTRO-ESOPHAGEAL REFLUX DISEASE WITHOUT ESOPHAGITIS: ICD-10-CM

## 2021-08-10 DIAGNOSIS — D25.9 LEIOMYOMA OF UTERUS, UNSPECIFIED: Chronic | ICD-10-CM

## 2021-08-10 PROCEDURE — 99284 EMERGENCY DEPT VISIT MOD MDM: CPT

## 2021-08-10 RX ORDER — SODIUM CHLORIDE 9 MG/ML
1000 INJECTION INTRAMUSCULAR; INTRAVENOUS; SUBCUTANEOUS ONCE
Refills: 0 | Status: COMPLETED | OUTPATIENT
Start: 2021-08-10 | End: 2021-08-10

## 2021-08-10 RX ORDER — ONDANSETRON 8 MG/1
4 TABLET, FILM COATED ORAL ONCE
Refills: 0 | Status: COMPLETED | OUTPATIENT
Start: 2021-08-10 | End: 2021-08-10

## 2021-08-10 RX ORDER — KETOROLAC TROMETHAMINE 30 MG/ML
15 SYRINGE (ML) INJECTION ONCE
Refills: 0 | Status: DISCONTINUED | OUTPATIENT
Start: 2021-08-10 | End: 2021-08-10

## 2021-08-10 RX ORDER — MORPHINE SULFATE 50 MG/1
4 CAPSULE, EXTENDED RELEASE ORAL ONCE
Refills: 0 | Status: DISCONTINUED | OUTPATIENT
Start: 2021-08-10 | End: 2021-08-10

## 2021-08-10 RX ADMIN — SODIUM CHLORIDE 1000 MILLILITER(S): 9 INJECTION INTRAMUSCULAR; INTRAVENOUS; SUBCUTANEOUS at 23:20

## 2021-08-10 NOTE — ED PROVIDER NOTE - PATIENT PORTAL LINK FT
You can access the FollowMyHealth Patient Portal offered by [facility] by registering at the following website: [email or web address]. By joining [facility]’s FollowMyHealth portal, you will also be able to view your health information using other applications (apps) compatible with our system.

## 2021-08-10 NOTE — ED PROVIDER NOTE - NSICDXPASTSURGICALHX_GEN_ALL_CORE_FT
PAST SURGICAL HISTORY:  Fibroid s/p surgery    History of back surgery     History of cholecystectomy     History of tonsillectomy     History of tonsillectomy

## 2021-08-10 NOTE — ED ADULT TRIAGE NOTE - CHIEF COMPLAINT QUOTE
R- flank pain radiating to RLQ since 1630 hours, vomiting states I had a kidney stone before it feels the same. actively vomiting in triage.  baseline heart rate 50-60, hx cardiac stent

## 2021-08-10 NOTE — ED ADULT NURSE NOTE - NSIMPLEMENTINTERV_GEN_ALL_ED
Implemented All Universal Safety Interventions:  [place] to call system. Call bell, personal items and telephone within reach. Instruct patient to call for assistance. Room bathroom lighting operational. Non-slip footwear when patient is off stretcher. Physically safe environment: no spills, clutter or unnecessary equipment. Stretcher in lowest position, wheels locked, appropriate side rails in place.

## 2021-08-10 NOTE — ED PROVIDER NOTE - CARE PROVIDER_API CALL
Logan Lo)  Urology  [address], [address] [place] [address]  Phone: [phone]  Fax: [phone]  Follow Up Time:

## 2021-08-10 NOTE — ED PROVIDER NOTE - OBJECTIVE STATEMENT
57 y/o F with PMHx of Rosacea, GERD and PSHx of Cardiac Stent presents to the ED c/o right flank pain radiating to RLQ since 1630 today. Pt reports similar sx when she had a kidney stone, describes the pain as "sharp like a knife" and is actively vomiting in the ED. Endorses groin pain but denies fever/chills, CP, SOB, hematuria, dysuria or diarrhea.

## 2021-08-10 NOTE — ED PROVIDER NOTE - CLINICAL SUMMARY MEDICAL DECISION MAKING FREE TEXT BOX
Pt with hx of kidney stones, presenting right flank pain and vomiting, likely repeat kidney stone, pain control, CT then reassess.

## 2021-08-10 NOTE — ED PROVIDER NOTE - NSICDXFAMILYHX_GEN_ALL_CORE_FT
FAMILY HISTORY:  Mother  Still living? Unknown  Family history of acute myocardial infarction, Age at diagnosis: 71-80

## 2021-08-10 NOTE — ED ADULT NURSE NOTE - OBJECTIVE STATEMENT
Pt alert and oriented present to ed with c/o R- flank pain radiating to RLQ since 1630 hours, vomiting states I had a kidney stone before it feels the same. actively vomiting in triage.  baseline heart rate 50-60, hx cardiac stent

## 2021-08-10 NOTE — ED PROVIDER NOTE - NSFOLLOWUPINSTRUCTIONS_ED_ALL_ED_FT
1) Please follow-up with the enclosed urologist.  If you cannot follow-up with your doctor(s), please return to the ED for any urgent issues.  2) If you have any worsening of symptoms, including fever, worsening pain, intractable vomiting, or any other concerns please return to the ED immediately.  3) Please continue taking your home medications as directed.  4) You may have been given a copy of your labs and/or imaging.  Please go over these with your primary care doctor.   5) Take 600mg Motrin (also called Advil or Ibuprofen) every 6 hours as needed for fever/pain/discomfort/swelling. You can take this with Tylenol 650 mg (also called acetaminophen) every 6 hours.   Don't use more than 3500mg of Tylenol in any 24-hour period. Make sure your other prescription/over-the-counter medications don't contain any Tylenol so you don't take too much.   If you have any stomach discomfort while taking Motrin, you can use TUMS or Pepcid or Zantac (these can all be bought without a prescription).   6) A prescription has been sent to your pharmacy. Please take it as directed.

## 2021-08-11 VITALS
SYSTOLIC BLOOD PRESSURE: 130 MMHG | DIASTOLIC BLOOD PRESSURE: 81 MMHG | RESPIRATION RATE: 18 BRPM | OXYGEN SATURATION: 98 % | HEART RATE: 60 BPM

## 2021-08-11 LAB
ALBUMIN SERPL ELPH-MCNC: 3.9 G/DL — SIGNIFICANT CHANGE UP (ref 3.3–5)
ALP SERPL-CCNC: 88 U/L — SIGNIFICANT CHANGE UP (ref 40–120)
ALT FLD-CCNC: 24 U/L — SIGNIFICANT CHANGE UP (ref 12–78)
ANION GAP SERPL CALC-SCNC: 6 MMOL/L — SIGNIFICANT CHANGE UP (ref 5–17)
AST SERPL-CCNC: 18 U/L — SIGNIFICANT CHANGE UP (ref 15–37)
BASOPHILS # BLD AUTO: 0.03 K/UL — SIGNIFICANT CHANGE UP (ref 0–0.2)
BASOPHILS NFR BLD AUTO: 0.3 % — SIGNIFICANT CHANGE UP (ref 0–2)
BILIRUB SERPL-MCNC: 0.3 MG/DL — SIGNIFICANT CHANGE UP (ref 0.2–1.2)
BUN SERPL-MCNC: 19 MG/DL — SIGNIFICANT CHANGE UP (ref 7–23)
CALCIUM SERPL-MCNC: 9.4 MG/DL — SIGNIFICANT CHANGE UP (ref 8.5–10.1)
CHLORIDE SERPL-SCNC: 109 MMOL/L — HIGH (ref 96–108)
CO2 SERPL-SCNC: 28 MMOL/L — SIGNIFICANT CHANGE UP (ref 22–31)
CREAT SERPL-MCNC: 1.26 MG/DL — SIGNIFICANT CHANGE UP (ref 0.5–1.3)
EOSINOPHIL # BLD AUTO: 0.04 K/UL — SIGNIFICANT CHANGE UP (ref 0–0.5)
EOSINOPHIL NFR BLD AUTO: 0.4 % — SIGNIFICANT CHANGE UP (ref 0–6)
GLUCOSE SERPL-MCNC: 144 MG/DL — HIGH (ref 70–99)
HCT VFR BLD CALC: 34.2 % — LOW (ref 34.5–45)
HGB BLD-MCNC: 11.4 G/DL — LOW (ref 11.5–15.5)
IMM GRANULOCYTES NFR BLD AUTO: 0.3 % — SIGNIFICANT CHANGE UP (ref 0–1.5)
LACTATE SERPL-SCNC: 1.4 MMOL/L — SIGNIFICANT CHANGE UP (ref 0.7–2)
LYMPHOCYTES # BLD AUTO: 1.15 K/UL — SIGNIFICANT CHANGE UP (ref 1–3.3)
LYMPHOCYTES # BLD AUTO: 11.5 % — LOW (ref 13–44)
MCHC RBC-ENTMCNC: 31.3 PG — SIGNIFICANT CHANGE UP (ref 27–34)
MCHC RBC-ENTMCNC: 33.3 GM/DL — SIGNIFICANT CHANGE UP (ref 32–36)
MCV RBC AUTO: 94 FL — SIGNIFICANT CHANGE UP (ref 80–100)
MONOCYTES # BLD AUTO: 0.4 K/UL — SIGNIFICANT CHANGE UP (ref 0–0.9)
MONOCYTES NFR BLD AUTO: 4 % — SIGNIFICANT CHANGE UP (ref 2–14)
NEUTROPHILS # BLD AUTO: 8.38 K/UL — HIGH (ref 1.8–7.4)
NEUTROPHILS NFR BLD AUTO: 83.5 % — HIGH (ref 43–77)
NRBC # BLD: 0 /100 WBCS — SIGNIFICANT CHANGE UP (ref 0–0)
PLATELET # BLD AUTO: 250 K/UL — SIGNIFICANT CHANGE UP (ref 150–400)
POTASSIUM SERPL-MCNC: 4.1 MMOL/L — SIGNIFICANT CHANGE UP (ref 3.5–5.3)
POTASSIUM SERPL-SCNC: 4.1 MMOL/L — SIGNIFICANT CHANGE UP (ref 3.5–5.3)
PROT SERPL-MCNC: 7.6 GM/DL — SIGNIFICANT CHANGE UP (ref 6–8.3)
RBC # BLD: 3.64 M/UL — LOW (ref 3.8–5.2)
RBC # FLD: 12.4 % — SIGNIFICANT CHANGE UP (ref 10.3–14.5)
SODIUM SERPL-SCNC: 143 MMOL/L — SIGNIFICANT CHANGE UP (ref 135–145)
WBC # BLD: 10.03 K/UL — SIGNIFICANT CHANGE UP (ref 3.8–10.5)
WBC # FLD AUTO: 10.03 K/UL — SIGNIFICANT CHANGE UP (ref 3.8–10.5)

## 2021-08-11 PROCEDURE — 74176 CT ABD & PELVIS W/O CONTRAST: CPT | Mod: 26,MA

## 2021-08-11 RX ADMIN — MORPHINE SULFATE 4 MILLIGRAM(S): 50 CAPSULE, EXTENDED RELEASE ORAL at 00:08

## 2021-08-11 RX ADMIN — SODIUM CHLORIDE 1000 MILLILITER(S): 9 INJECTION INTRAMUSCULAR; INTRAVENOUS; SUBCUTANEOUS at 01:11

## 2021-08-11 RX ADMIN — Medication 15 MILLIGRAM(S): at 00:51

## 2021-08-11 RX ADMIN — ONDANSETRON 4 MILLIGRAM(S): 8 TABLET, FILM COATED ORAL at 00:08

## 2021-08-11 RX ADMIN — MORPHINE SULFATE 4 MILLIGRAM(S): 50 CAPSULE, EXTENDED RELEASE ORAL at 00:51

## 2021-08-11 RX ADMIN — Medication 15 MILLIGRAM(S): at 00:08

## 2021-08-12 ENCOUNTER — TRANSCRIPTION ENCOUNTER (OUTPATIENT)
Age: 56
End: 2021-08-12

## 2021-08-17 ENCOUNTER — APPOINTMENT (OUTPATIENT)
Dept: UROLOGY | Facility: CLINIC | Age: 56
End: 2021-08-17

## 2021-08-17 ENCOUNTER — APPOINTMENT (OUTPATIENT)
Dept: UROLOGY | Facility: CLINIC | Age: 56
End: 2021-08-17
Payer: COMMERCIAL

## 2021-08-17 ENCOUNTER — TRANSCRIPTION ENCOUNTER (OUTPATIENT)
Age: 56
End: 2021-08-17

## 2021-08-17 ENCOUNTER — NON-APPOINTMENT (OUTPATIENT)
Age: 56
End: 2021-08-17

## 2021-08-17 VITALS
DIASTOLIC BLOOD PRESSURE: 63 MMHG | TEMPERATURE: 97.1 F | SYSTOLIC BLOOD PRESSURE: 135 MMHG | HEIGHT: 62 IN | WEIGHT: 160 LBS | HEART RATE: 55 BPM | BODY MASS INDEX: 29.44 KG/M2

## 2021-08-17 DIAGNOSIS — N20.1 CALCULUS OF URETER: ICD-10-CM

## 2021-08-17 DIAGNOSIS — Z86.79 PERSONAL HISTORY OF OTHER DISEASES OF THE CIRCULATORY SYSTEM: ICD-10-CM

## 2021-08-17 DIAGNOSIS — Z87.442 PERSONAL HISTORY OF URINARY CALCULI: ICD-10-CM

## 2021-08-17 DIAGNOSIS — Z80.0 FAMILY HISTORY OF MALIGNANT NEOPLASM OF DIGESTIVE ORGANS: ICD-10-CM

## 2021-08-17 DIAGNOSIS — Z86.39 PERSONAL HISTORY OF OTHER ENDOCRINE, NUTRITIONAL AND METABOLIC DISEASE: ICD-10-CM

## 2021-08-17 PROCEDURE — 99204 OFFICE O/P NEW MOD 45 MIN: CPT

## 2021-08-17 NOTE — HISTORY OF PRESENT ILLNESS
[FreeTextEntry1] : 56 year old woman presents to establish urologic care after recent ED visit for 3 mm right UVJ stone. Patient has history of nephrolithiasis and one prior stone episode 6 years ago. No prior lithotripsy procedures. Most recent episode was one week ago. She developed sudden onset frequency, urgency, dysuria, right flank pain. She presented to ED for evaluation where she was found to have a 3 mm right UVJ stone and a 4 mm left non-obstructing stone. Her UA was negative. She was afebrile with no leukocytosis. Her pain was treated and she was discharged. She has not passed a stone, though she has not had any pain episodes. She feels a little sore on the right and left. No fevers, chills, dysuria, hematuria, frequency, urgency, cloudy urine or foul smelling urine.

## 2021-08-17 NOTE — LETTER BODY
[Dear  ___] : Dear  [unfilled], [Courtesy Letter:] : I had the pleasure of seeing your patient, [unfilled], in my office today. [Please see my note below.] : Please see my note below. [Consult Closing:] : Thank you very much for allowing me to participate in the care of this patient.  If you have any questions, please do not hesitate to contact me. [Sincerely,] : Sincerely, [FreeTextEntry3] : Aníbal Arellano MD

## 2021-08-17 NOTE — ASSESSMENT
[FreeTextEntry1] : Ms. ELIDA ONTIVEROS is a 56 year year old woman recently presented to ED with 3 mm right UVJ stone and 4 mm left non-obstructing renal stone. Pain has resolved with some residual soreness. UA negative. No leukocytosis. \par \par -I personally reviewed most recent imaging, urinalysis, and laboratory studies when performed.\par -The following points were reviewed in detail as part of the patient's counseling:\par -natural history of stone disease and stone recurrence\par -risk of ureteral stricture in patients with history of stone disease/prior endourologic surgery/prior abdominal surgery/radiation/abdominal malignancy\par -role of 24-hour urine in prevention of stone disease\par -role of diet in stone prevention in general and specific to patient's stone composition (if known)\par -role of fluid intake in stone prevention\par -role of pharmacotherapy in stone prevention in general and specific to patient's stone composition (if known)\par -Operative risks including success rates and complications for appropriate endourological interventions\par -Probability of spontaneous stone passage based on stone size and location\par -Natural history of observed (i.e. untreated) urolithiasis\par -Diagnostic accuracy for imaging modalities including plain radiography/KUB, ultrasound, CT scan\par -Natural history of ureteral obstruction and potential risk for renal deterioration\par \par Will plan for\par  - f/u in 3 weeks for renal ultrasound\par  - Discussed plan for stone prevention following ultrasound\par

## 2021-08-18 LAB
BILIRUB UR QL STRIP: NORMAL
CLARITY UR: CLEAR
COLLECTION METHOD: NORMAL
GLUCOSE UR-MCNC: NORMAL
HCG UR QL: 0.2 EU/DL
HGB UR QL STRIP.AUTO: NORMAL
KETONES UR-MCNC: NORMAL
LEUKOCYTE ESTERASE UR QL STRIP: NORMAL
NITRITE UR QL STRIP: NORMAL
PH UR STRIP: 7.5
PROT UR STRIP-MCNC: NORMAL
SP GR UR STRIP: 1.01

## 2021-08-25 ENCOUNTER — APPOINTMENT (OUTPATIENT)
Dept: UROLOGY | Facility: CLINIC | Age: 56
End: 2021-08-25

## 2021-09-07 ENCOUNTER — APPOINTMENT (OUTPATIENT)
Dept: UROLOGY | Facility: CLINIC | Age: 56
End: 2021-09-07

## 2021-09-07 ENCOUNTER — TRANSCRIPTION ENCOUNTER (OUTPATIENT)
Age: 56
End: 2021-09-07

## 2021-09-23 ENCOUNTER — NON-APPOINTMENT (OUTPATIENT)
Age: 56
End: 2021-09-23

## 2021-12-09 ENCOUNTER — APPOINTMENT (OUTPATIENT)
Dept: HEART AND VASCULAR | Facility: CLINIC | Age: 56
End: 2021-12-09

## 2022-01-28 ENCOUNTER — APPOINTMENT (OUTPATIENT)
Dept: UROLOGY | Facility: CLINIC | Age: 57
End: 2022-01-28
Payer: COMMERCIAL

## 2022-01-28 PROCEDURE — 99214 OFFICE O/P EST MOD 30 MIN: CPT | Mod: 95

## 2022-01-28 NOTE — LETTER BODY
[Dear  ___] : Dear  [unfilled], [Courtesy Letter:] : I had the pleasure of seeing your patient, [unfilled], in my office today. [Please see my note below.] : Please see my note below. [Sincerely,] : Sincerely, [FreeTextEntry3] : Aníbal Arellano MD

## 2022-01-28 NOTE — ASSESSMENT
[FreeTextEntry1] : Ms. ELIDA ONTIVEROS is a 56 year year old woman recently presented to ED with 3 mm right UVJ stone and 4 mm left non-obstructing renal stone. Pain has resolved with some residual soreness. UA negative. No leukocytosis. Renal ultrasound two weeks later showed no stone or hydronephrosis. Likely that patient has passed the right ureteral stone. It is possible that the left renal stone is still present but not seen on ultrasound. 24 hr urinalysis completed showed marked hypocitraturia and very low urine volume (600 mL for day). She says she does not drink much water because she urinates frequently.\par \par -I personally reviewed most recent imaging, urinalysis, and laboratory studies when performed.\par -The following points were reviewed in detail as part of the patient's counseling:\par -natural history of stone disease and stone recurrence\par -risk of ureteral stricture in patients with history of stone disease/prior endourologic surgery/prior abdominal surgery/radiation/abdominal malignancy\par -role of 24-hour urine in prevention of stone disease\par -role of diet in stone prevention in general and specific to patient's stone composition (if known)\par -role of fluid intake in stone prevention\par -role of pharmacotherapy in stone prevention in general and specific to patient's stone composition (if known)\par \par - Lemon juice 4 oz daily, patient hesitant to begin potassium citrate\par - Increase water intake significantly (goal is 2.5 L of water daily)\par - Behavioral modifications to decrease urinary frequency and urgency: limit fluid intake prior to bedtime, limit caffeine and alcohol intake\par  - Repeat 24 hr urinalysis in 6 weeks

## 2022-01-28 NOTE — HISTORY OF PRESENT ILLNESS
[Home] : at home, [unfilled] , at the time of the visit. [Medical Office: ([facility])___] : at the medical office located in  [Verbal consent obtained from patient] : the patient, [unfilled] [FreeTextEntry1] : 8/17/21: 56 year old woman presents to establish urologic care after recent ED visit for 3 mm right UVJ stone. Patient has history of nephrolithiasis and one prior stone episode 6 years ago. No prior lithotripsy procedures. Most recent episode was one week ago. She developed sudden onset frequency, urgency, dysuria, right flank pain. She presented to ED for evaluation where she was found to have a 3 mm right UVJ stone and a 4 mm left non-obstructing stone. Her UA was negative. She was afebrile with no leukocytosis. Her pain was treated and she was discharged. She has not passed a stone, though she has not had any pain episodes. She feels a little sore on the right and left. No fevers, chills, dysuria, hematuria, frequency, urgency, cloudy urine or foul smelling urine.\par \par 9/10/21: renal ultrasound showed no hydronephrosis or stones\par \par 1/28/22: Telehealth follow up to discuss litholink result. Doing well. No fevers, chills, dysuria, hematuria or flank pain. Litholink with marked hypocitraturia and low urine volume.

## 2022-02-28 ENCOUNTER — NON-APPOINTMENT (OUTPATIENT)
Age: 57
End: 2022-02-28

## 2022-02-28 ENCOUNTER — APPOINTMENT (OUTPATIENT)
Dept: HEART AND VASCULAR | Facility: CLINIC | Age: 57
End: 2022-02-28
Payer: COMMERCIAL

## 2022-02-28 VITALS
TEMPERATURE: 96 F | HEART RATE: 63 BPM | HEIGHT: 62 IN | OXYGEN SATURATION: 99 % | WEIGHT: 160 LBS | SYSTOLIC BLOOD PRESSURE: 123 MMHG | BODY MASS INDEX: 29.44 KG/M2 | DIASTOLIC BLOOD PRESSURE: 81 MMHG

## 2022-02-28 PROCEDURE — 99214 OFFICE O/P EST MOD 30 MIN: CPT | Mod: 25

## 2022-02-28 PROCEDURE — 93000 ELECTROCARDIOGRAM COMPLETE: CPT

## 2022-02-28 PROCEDURE — 36415 COLL VENOUS BLD VENIPUNCTURE: CPT

## 2022-02-28 RX ORDER — NORETHINDRONE ACETATE AND ETHINYL ESTRADIOL .03; 1.5 MG/1; MG/1
TABLET ORAL
Refills: 0 | Status: DISCONTINUED | COMMUNITY
End: 2022-02-28

## 2022-02-28 NOTE — HISTORY OF PRESENT ILLNESS
[FreeTextEntry1] : 55 y/o female with h/o htn, hl, predm, family h/o early cvd, overweight, gerd who presents for f/up\par \par Last seen \par \par notes left arm numbness - mostly at night while sleeping\par No cp,  palpitations, syncope, edema, orthopnea, pnd\par notes new dry cough - 4-6 months \par notes some sob\par \par had nephrolithiasis this past year\par \par Norvasc dc'd due to concern over edema and lisinopril 5 mg daily started\par Noted a cough on lisinopril, so changed to losartan which was recalled\par then placed on olmesartan\par HR noted to be low on day of echo and BB dose decreased to 50 daily\par \par Holter : avg hr 62, 27 VE, 13 SVE\par \par \par CTA : patent mid LAD stent, prox/mid LCx minimal stenosis, RCA normal\par \par Echo : normal lv size/thickness, ef 65%, trace pr/mr/tr, normal pap, normal ivc\par \par KASSANDRA: : neg for dvt\par \par \par Seen for cp and had CTA  with severe stenosis noncalcified plaque mid LAD\par \par \par Sent for cath 18 LM nl, pLAD 80% FFR 0.75 - s/p LISA Promus pLAD, normal Lcx/RCA, ef 65%, no as/mr, edp 11\par bifurcation, Successful LISA prox LAD, LCx & RCA normal, EF normal.\par \par Echo : normal lv size/thickness, no wma, ef 60-65%, no valvular disease\par \par  \par \par CAIN w doppler:  - normal\par \par \par Stress level high\par \par \par HTN diagnosed \par HL diagnosed \par \par Had 1 ectopic and 2 miscarriages\par \par Started menopause age 45\par \par \par \par \par PMH/PSH:\par htn\par hl\par overweight\par predm\par c spine surgery \par fibroid surgery \par tonsillectomy \par gerd\par rosacea\par cad s/p LISA pLAD \par nephrolithiasis\par \par \par \par ALL:\par pcn - facial/throat swelling\par eggs\par \par \par SH:\par no tobacco\par social etoh\par no drugs\par from [place]\par \par no kids\par 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

## 2022-02-28 NOTE — DISCUSSION/SUMMARY
[Patient] : the patient [___ Month(s)] : in [unfilled] month(s) [FreeTextEntry1] : 57 y/o female with h/o htn, hl, predm, family h/o early cvd, gerd, overweight, cad who presents for f/up of cp s/p LISA pLAD\par \par -ordered CTA and Echo given arm pain/sob\par -continue olmesartan 40 qd\par -continue hctz 12.5 mg qd\par -continue metoprolol 50 qd\par -advised she f/up with cspine surgeon given left arm numbness/tingling\par -holter 9/19: avg hr 62, 27 VE, 13 SVE\par -CTA 8/19: patent mid LAD stent, prox/mid LCx minimal stenosis, RCA normal\par -Echo 8/19: normal lv size/thickness, ef 65%, trace pr/mr/tr, normal pap, normal ivc\par -KASSANDRA: 8/19: neg for dvt\par -ekg ordered today - sr, normal intervals, no st/t changes\par -labs 2020 reviewed, ordered labs today\par -CTA 9/18 with severe stenosis noncalcified plaque mid LAD\par -Echo 9/18: normal lv size/thickness, no wma, ef 60-65%, no valvular disease\par -continue asa, plavix and lipitor \par -cath 9/20/18 LM nl, pLAD 80% s/p PROMUS LISA, Lcx/Rca normal, ef 65%\par bifurcation, Successful LISA prox LAD, LCx & RCA normal, EF normal. \par -CAIN w doppler 12/18: normal\par -pulm referral - consider change ARB if they believe cough 2/2 arb - family h/o asthma so would like to rule out other causes cough/sob\par -counseled on cvd risk factors\par -f/up 3 months for cad, htn\par \par I have spent 30 minutes reviewing records, labs, tests and discussing importance of medication for htn and left arm pain/sob evaluation

## 2022-03-01 LAB
ALBUMIN SERPL ELPH-MCNC: 4.6 G/DL
ALP BLD-CCNC: 98 U/L
ALT SERPL-CCNC: 15 U/L
ANION GAP SERPL CALC-SCNC: 12 MMOL/L
APPEARANCE: CLEAR
AST SERPL-CCNC: 20 U/L
BACTERIA: NEGATIVE
BASOPHILS # BLD AUTO: 0.02 K/UL
BASOPHILS NFR BLD AUTO: 0.4 %
BILIRUB SERPL-MCNC: 0.2 MG/DL
BILIRUBIN URINE: NEGATIVE
BLOOD URINE: NEGATIVE
BUN SERPL-MCNC: 17 MG/DL
CALCIUM SERPL-MCNC: 9.4 MG/DL
CHLORIDE SERPL-SCNC: 105 MMOL/L
CHOLEST SERPL-MCNC: 133 MG/DL
CO2 SERPL-SCNC: 26 MMOL/L
COLOR: NORMAL
CREAT SERPL-MCNC: 0.89 MG/DL
CREAT SPEC-SCNC: 48 MG/DL
EGFR: 75 ML/MIN/1.73M2
EOSINOPHIL # BLD AUTO: 0.17 K/UL
EOSINOPHIL NFR BLD AUTO: 3.1 %
ESTIMATED AVERAGE GLUCOSE: 131 MG/DL
GLUCOSE QUALITATIVE U: NEGATIVE
GLUCOSE SERPL-MCNC: 96 MG/DL
HBA1C MFR BLD HPLC: 6.2 %
HCT VFR BLD CALC: 36.3 %
HDLC SERPL-MCNC: 43 MG/DL
HGB BLD-MCNC: 11.7 G/DL
HYALINE CASTS: 0 /LPF
IMM GRANULOCYTES NFR BLD AUTO: 0.2 %
KETONES URINE: NEGATIVE
LDLC SERPL CALC-MCNC: 73 MG/DL
LEUKOCYTE ESTERASE URINE: NEGATIVE
LYMPHOCYTES # BLD AUTO: 1.77 K/UL
LYMPHOCYTES NFR BLD AUTO: 32.2 %
MAGNESIUM SERPL-MCNC: 1.6 MG/DL
MAN DIFF?: NORMAL
MCHC RBC-ENTMCNC: 31.4 PG
MCHC RBC-ENTMCNC: 32.2 GM/DL
MCV RBC AUTO: 97.3 FL
MICROALBUMIN 24H UR DL<=1MG/L-MCNC: <1.2 MG/DL
MICROALBUMIN/CREAT 24H UR-RTO: NORMAL MG/G
MICROSCOPIC-UA: NORMAL
MONOCYTES # BLD AUTO: 0.42 K/UL
MONOCYTES NFR BLD AUTO: 7.6 %
NEUTROPHILS # BLD AUTO: 3.11 K/UL
NEUTROPHILS NFR BLD AUTO: 56.5 %
NITRITE URINE: NEGATIVE
NONHDLC SERPL-MCNC: 90 MG/DL
PH URINE: 8
PLATELET # BLD AUTO: 256 K/UL
POTASSIUM SERPL-SCNC: 4.4 MMOL/L
PROT SERPL-MCNC: 7 G/DL
PROTEIN URINE: NEGATIVE
RBC # BLD: 3.73 M/UL
RBC # FLD: 12.4 %
RED BLOOD CELLS URINE: 1 /HPF
SODIUM SERPL-SCNC: 143 MMOL/L
SPECIFIC GRAVITY URINE: 1.01
SQUAMOUS EPITHELIAL CELLS: 1 /HPF
TRIGL SERPL-MCNC: 84 MG/DL
TSH SERPL-ACNC: 1.8 UIU/ML
UROBILINOGEN URINE: NORMAL
WBC # FLD AUTO: 5.5 K/UL
WHITE BLOOD CELLS URINE: 0 /HPF

## 2022-04-08 ENCOUNTER — APPOINTMENT (OUTPATIENT)
Dept: HEART AND VASCULAR | Facility: CLINIC | Age: 57
End: 2022-04-08

## 2022-04-13 ENCOUNTER — OUTPATIENT (OUTPATIENT)
Dept: OUTPATIENT SERVICES | Facility: HOSPITAL | Age: 57
LOS: 1 days | End: 2022-04-13
Payer: COMMERCIAL

## 2022-04-13 ENCOUNTER — APPOINTMENT (OUTPATIENT)
Dept: CT IMAGING | Facility: HOSPITAL | Age: 57
End: 2022-04-13

## 2022-04-13 DIAGNOSIS — Z98.89 OTHER SPECIFIED POSTPROCEDURAL STATES: Chronic | ICD-10-CM

## 2022-04-13 DIAGNOSIS — D25.9 LEIOMYOMA OF UTERUS, UNSPECIFIED: Chronic | ICD-10-CM

## 2022-04-13 DIAGNOSIS — Z90.89 ACQUIRED ABSENCE OF OTHER ORGANS: Chronic | ICD-10-CM

## 2022-04-13 DIAGNOSIS — Z90.49 ACQUIRED ABSENCE OF OTHER SPECIFIED PARTS OF DIGESTIVE TRACT: Chronic | ICD-10-CM

## 2022-04-13 LAB — POCT ISTAT CREATININE: 0.9 MG/DL — SIGNIFICANT CHANGE UP (ref 0.5–1.3)

## 2022-04-13 PROCEDURE — 82565 ASSAY OF CREATININE: CPT

## 2022-04-13 PROCEDURE — 75574 CT ANGIO HRT W/3D IMAGE: CPT

## 2022-04-13 PROCEDURE — 75574 CT ANGIO HRT W/3D IMAGE: CPT | Mod: 26

## 2022-04-14 ENCOUNTER — TRANSCRIPTION ENCOUNTER (OUTPATIENT)
Age: 57
End: 2022-04-14

## 2022-04-15 ENCOUNTER — TRANSCRIPTION ENCOUNTER (OUTPATIENT)
Age: 57
End: 2022-04-15

## 2022-04-19 ENCOUNTER — TRANSCRIPTION ENCOUNTER (OUTPATIENT)
Age: 57
End: 2022-04-19

## 2022-04-27 ENCOUNTER — APPOINTMENT (OUTPATIENT)
Dept: PULMONOLOGY | Facility: CLINIC | Age: 57
End: 2022-04-27
Payer: COMMERCIAL

## 2022-04-27 VITALS
HEART RATE: 54 BPM | BODY MASS INDEX: 30 KG/M2 | TEMPERATURE: 97.2 F | OXYGEN SATURATION: 100 % | WEIGHT: 163 LBS | HEIGHT: 62 IN | SYSTOLIC BLOOD PRESSURE: 133 MMHG | DIASTOLIC BLOOD PRESSURE: 60 MMHG | RESPIRATION RATE: 12 BRPM

## 2022-04-27 DIAGNOSIS — I25.10 ATHEROSCLEROTIC HEART DISEASE OF NATIVE CORONARY ARTERY W/OUT ANGINA PECTORIS: ICD-10-CM

## 2022-04-27 PROCEDURE — 99204 OFFICE O/P NEW MOD 45 MIN: CPT

## 2022-04-27 NOTE — HISTORY OF PRESENT ILLNESS
[TextBox_4] : 04/27/2022 :  ELIDA ONTIVEROS is a 56 year old female who is being evaluated for dyspnea on exertion.  Complains increasing dyspnea past 6 months, sometimes at rest, mostly exertional.  Can climb two flights of stairs or walk 3 blocks before getting dyspneic.  Sometimes accompanied by cough or wheeze.  No hx of asthma, inhaler use, smoking.  Has hx coronary disease with stenting.  Her dyspnea is sometimes accompanied by feeling of chest tightness or light headedness.  IN past on lisinopril, stopped for cough, on beta blocker (metoprolol)\par \par Also notes poor sleep.  Sleeps supine after neck surgery. Bed 1030, latency 20 min, 2-3 wakes, up at 430 for work.  Often sleepy at work  Snoring noted.  Had unattended home sleep testing 5-6 yrs ago with some obstructive sleep apnea, rx not recommended.  No recent wt change.

## 2022-04-27 NOTE — ASSESSMENT
[FreeTextEntry1] : dyspnea on exertion\par \par DDx includes lung disease (specifically asthma), cardiac dysfunction, deconditioning..  Asked her to have pulmonary function testing and 6MWT.  Will not start rx before getting testing. No distress today.\par \par excessive daytime somnolence and poor sleep\par \par Based on history and physical exam, sleep disordered breathing is at least moderately likely.  The patient was advised to have overnight unattended home sleep testing as a first approach.  If this is not definitive may need overnight polysomnography. Will be seen in follow up after testing.

## 2022-04-27 NOTE — PHYSICAL EXAM
[No Acute Distress] : no acute distress [Well Nourished] : well nourished [Well Developed] : well developed [Normal Oropharynx] : normal oropharynx [Low Lying Soft Palate] : low lying soft palate [Normal Appearance] : normal appearance [No Neck Mass] : no neck mass [Normal Rate/Rhythm] : normal rate/rhythm [Normal S1, S2] : normal s1, s2 [No Murmurs] : no murmurs [No Resp Distress] : no resp distress [Clear to Auscultation Bilaterally] : clear to auscultation bilaterally [Normal Gait] : normal gait [No Clubbing] : no clubbing [No Cyanosis] : no cyanosis [TextBox_11] : no tonsils [TextBox_105] : trace LE edema

## 2022-05-04 ENCOUNTER — NON-APPOINTMENT (OUTPATIENT)
Age: 57
End: 2022-05-04

## 2022-05-04 ENCOUNTER — APPOINTMENT (OUTPATIENT)
Dept: ENDOCRINOLOGY | Facility: CLINIC | Age: 57
End: 2022-05-04
Payer: COMMERCIAL

## 2022-05-04 VITALS
HEART RATE: 54 BPM | BODY MASS INDEX: 29.63 KG/M2 | WEIGHT: 161 LBS | DIASTOLIC BLOOD PRESSURE: 74 MMHG | SYSTOLIC BLOOD PRESSURE: 114 MMHG | TEMPERATURE: 97.2 F | HEIGHT: 62 IN | OXYGEN SATURATION: 100 %

## 2022-05-04 DIAGNOSIS — Z83.3 FAMILY HISTORY OF DIABETES MELLITUS: ICD-10-CM

## 2022-05-04 DIAGNOSIS — Z91.012 ALLERGY TO EGGS: ICD-10-CM

## 2022-05-04 DIAGNOSIS — E66.3 OVERWEIGHT: ICD-10-CM

## 2022-05-04 DIAGNOSIS — Z82.49 FAMILY HISTORY OF ISCHEMIC HEART DISEASE AND OTHER DISEASES OF THE CIRCULATORY SYSTEM: ICD-10-CM

## 2022-05-04 DIAGNOSIS — R73.03 PREDIABETES.: ICD-10-CM

## 2022-05-04 PROCEDURE — 99204 OFFICE O/P NEW MOD 45 MIN: CPT

## 2022-05-04 NOTE — CONSULT LETTER
[Dear  ___] : Dear  [unfilled], [Consult Letter:] : I had the pleasure of evaluating your patient, [unfilled]. [Please see my note below.] : Please see my note below. [Consult Closing:] : Thank you very much for allowing me to participate in the care of this patient.  If you have any questions, please do not hesitate to contact me. [Sincerely,] : Sincerely, [FreeTextEntry3] : Melody Gibson MD

## 2022-05-04 NOTE — HISTORY OF PRESENT ILLNESS
[FreeTextEntry1] : Patient is a 57 yo woman establishing endocrine care for prediabetes\par \par Patient with hx of MABLE, HTN, HLD and prediabetes.  \par Prediabetes diagnosed since 2012 on routine blood work.  Has a history of CAD requiring stents. Was told to exercise, lose weight and watch food intake.  Reports she does not crave sweets but craves carbs\par Breakfast: whatever flavored "Cheeriors," oatmeal. Splurges on the weekend with waffles/pancake.  Coffee with half and half + splenda\par Lunch: apple or fruit cup; used to eat lot of chips but stopped after stent in 2018\par Dinner: pasta, bread, salads; fast food like burgers, fried chicken but cut down on fries\par Does not drink soda, but "may have a sip."\par Drinks cranberry-apple juice because "I like it."  States she uses use and does diet juice\par During the pandemic, she was ordering out food frequently.  Was eating mostly Mexican tortilla chips with guacamole\par Walks, does not do strenuous exercise due to cervical spine.  Job is mostly sedentary.\par Used to get steroid injections for spinal issues but not regularly these days.  \par 2/28/22\par A1c 6.2%

## 2022-05-04 NOTE — ASSESSMENT
[Weight Loss] : weight loss [FreeTextEntry1] : Patient is a 57 yo woman with CAD, prediabetes and BMI of 29 establishing endocrine care\par \par 1. Prediabetes\par -Serum A1c progressed from 5.8% to 6.2% recently. She is not adhering to healthy lifestyle\par -reports that carbohydrates are her favorite and she eats fast food due to convenience\par -we discussed her risks of progression to T2DM.  Family hx significant for diabetes-father  from complications\par -nutritional education provided today.  Offered some alternatives to carb-heavy breakfast but she does not like dairy and has reported egg allergy.  Discussed the importance of meal prepping and planning on the weekends so that foods of convenience are eaten less\par -refer to nutrition\par -repeat A1c in 2022 as it is too early to check it now\par \par 2. Overweight\par -BMI is 29 and she has CAD.  Medications for weight management can be considered only after lifestyle modifications. She has, to date, made no effort to diet/exercise. The medicines are effective in concert with healthy lifestyle/dietary behaviors\par -patient can consider metformin in the future; Has family hx of pancreatic cancer in sister who was a smoker, does not think it was MEN but details are unclear\par -clinically and chemically euthyroid\par \par Follow up in 4 months

## 2022-05-04 NOTE — REVIEW OF SYSTEMS
[Fatigue] : no fatigue [Decreased Appetite] : appetite not decreased [Recent Weight Gain (___ Lbs)] : recent weight gain: [unfilled] lbs [Dysphagia] : no dysphagia [Dysphonia] : no dysphonia [Chest Pain] : no chest pain [Palpitations] : no palpitations [Shortness Of Breath] : no shortness of breath [Nausea] : no nausea [Constipation] : no constipation [Vomiting] : no vomiting [Tremors] : no tremors

## 2022-05-16 ENCOUNTER — APPOINTMENT (OUTPATIENT)
Dept: HEART AND VASCULAR | Facility: CLINIC | Age: 57
End: 2022-05-16
Payer: COMMERCIAL

## 2022-05-16 VITALS
SYSTOLIC BLOOD PRESSURE: 120 MMHG | HEIGHT: 62 IN | BODY MASS INDEX: 29.63 KG/M2 | WEIGHT: 161 LBS | HEART RATE: 51 BPM | DIASTOLIC BLOOD PRESSURE: 75 MMHG

## 2022-05-16 PROCEDURE — 93306 TTE W/DOPPLER COMPLETE: CPT

## 2022-06-13 ENCOUNTER — APPOINTMENT (OUTPATIENT)
Dept: PULMONOLOGY | Facility: CLINIC | Age: 57
End: 2022-06-13

## 2022-07-14 ENCOUNTER — NON-APPOINTMENT (OUTPATIENT)
Age: 57
End: 2022-07-14

## 2022-07-15 ENCOUNTER — APPOINTMENT (OUTPATIENT)
Dept: SLEEP CENTER | Facility: HOME HEALTH | Age: 57
End: 2022-07-15

## 2022-07-18 ENCOUNTER — APPOINTMENT (OUTPATIENT)
Dept: PULMONOLOGY | Facility: CLINIC | Age: 57
End: 2022-07-18

## 2022-09-19 ENCOUNTER — APPOINTMENT (OUTPATIENT)
Dept: PULMONOLOGY | Facility: CLINIC | Age: 57
End: 2022-09-19

## 2022-10-07 ENCOUNTER — APPOINTMENT (OUTPATIENT)
Dept: UROLOGY | Facility: CLINIC | Age: 57
End: 2022-10-07

## 2022-10-07 DIAGNOSIS — N20.0 CALCULUS OF KIDNEY: ICD-10-CM

## 2022-10-07 PROCEDURE — 99441: CPT | Mod: 95

## 2022-10-07 NOTE — HISTORY OF PRESENT ILLNESS
[Home] : at home, [unfilled] , at the time of the visit. [Medical Office: ([facility])___] : at the medical office located in  [Verbal consent obtained from patient] : the patient, [unfilled] [FreeTextEntry1] : 8/17/21: 56 year old woman presents to establish urologic care after recent ED visit for 3 mm right UVJ stone. Patient has history of nephrolithiasis and one prior stone episode 6 years ago. No prior lithotripsy procedures. Most recent episode was one week ago. She developed sudden onset frequency, urgency, dysuria, right flank pain. She presented to ED for evaluation where she was found to have a 3 mm right UVJ stone and a 4 mm left non-obstructing stone. Her UA was negative. She was afebrile with no leukocytosis. Her pain was treated and she was discharged. She has not passed a stone, though she has not had any pain episodes. She feels a little sore on the right and left. No fevers, chills, dysuria, hematuria, frequency, urgency, cloudy urine or foul smelling urine.\par \par 9/10/21: renal ultrasound showed no hydronephrosis or stones\par \par 1/28/22: Telehealth follow up to discuss litholink result. Doing well. No fevers, chills, dysuria, hematuria or flank pain. Litholink with marked hypocitraturia and low urine volume. \par \par 10/7/22: Telephone call to discuss Litholink. Patient doing well. No fevers, chills, dysuria, hematuria, flank pain. She feels her urine has a slight odor to it. Litholink with improved urine volume. Continued low citrate. She is drinking lemon juice occasionally.\par \par

## 2022-10-07 NOTE — ASSESSMENT
[FreeTextEntry1] : Ms. ELIDA ONTIVEROS is a 56 year year old woman recently presented to ED with 3 mm right UVJ stone and 4 mm left non-obstructing renal stone. Pain has resolved with some residual soreness. UA negative. No leukocytosis. Renal ultrasound two weeks later showed no stone or hydronephrosis. Likely that patient has passed the right ureteral stone. It is possible that the left renal stone is still present but not seen on ultrasound. 24 hr urinalysis completed showed marked hypocitraturia and very low urine volume (600 mL for day). She says she does not drink much water because she urinates frequently. Repeat 24 hr urine showed improved urine volume with continued hypocitraturia. SS Caox, CaP, and UA all low.\par \par -I personally reviewed most recent imaging, urinalysis, and laboratory studies when performed.\par -The following points were reviewed in detail as part of the patient's counseling:\par -natural history of stone disease and stone recurrence\par -risk of ureteral stricture in patients with history of stone disease/prior endourologic surgery/prior abdominal surgery/radiation/abdominal malignancy\par -role of 24-hour urine in prevention of stone disease\par -role of diet in stone prevention in general and specific to patient's stone composition (if known)\par -role of fluid intake in stone prevention\par -role of pharmacotherapy in stone prevention in general and specific to patient's stone composition (if known)\par \par - Lemon juice 4 oz daily, patient hesitant to begin potassium citrate\par - Increase water intake significantly (goal is 2.5 L of water daily)\par - Behavioral modifications to decrease urinary frequency and urgency: limit fluid intake prior to bedtime, limit caffeine and alcohol intake\par  - F/U for renal ultrasound

## 2022-10-24 ENCOUNTER — APPOINTMENT (OUTPATIENT)
Dept: PULMONOLOGY | Facility: CLINIC | Age: 57
End: 2022-10-24

## 2022-10-24 VITALS
BODY MASS INDEX: 29.63 KG/M2 | SYSTOLIC BLOOD PRESSURE: 123 MMHG | RESPIRATION RATE: 12 BRPM | OXYGEN SATURATION: 100 % | HEIGHT: 62 IN | HEART RATE: 61 BPM | TEMPERATURE: 97.2 F | DIASTOLIC BLOOD PRESSURE: 75 MMHG | WEIGHT: 161 LBS

## 2022-10-24 DIAGNOSIS — R06.00 DYSPNEA, UNSPECIFIED: ICD-10-CM

## 2022-10-24 DIAGNOSIS — R06.89 DYSPNEA, UNSPECIFIED: ICD-10-CM

## 2022-10-24 LAB — SARS-COV-2 N GENE NPH QL NAA+PROBE: NOT DETECTED

## 2022-10-24 PROCEDURE — 94729 DIFFUSING CAPACITY: CPT | Mod: 26

## 2022-10-24 PROCEDURE — 99214 OFFICE O/P EST MOD 30 MIN: CPT | Mod: 25

## 2022-10-24 PROCEDURE — ZZZZZ: CPT

## 2022-10-24 PROCEDURE — 94010 BREATHING CAPACITY TEST: CPT | Mod: 26

## 2022-10-24 PROCEDURE — 94727 GAS DIL/WSHOT DETER LNG VOL: CPT | Mod: 26

## 2022-10-24 NOTE — PHYSICAL EXAM
[No Acute Distress] : no acute distress [Normal Oropharynx] : normal oropharynx [Normal Appearance] : normal appearance [Normal Rate/Rhythm] : normal rate/rhythm [Normal S1, S2] : normal s1, s2 [No Resp Distress] : no resp distress [Clear to Auscultation Bilaterally] : clear to auscultation bilaterally [Normal to Percussion] : normal to percussion

## 2022-10-24 NOTE — ASSESSMENT
[FreeTextEntry1] : Pulmonary function testing today is most consistent with restriction related to body habitus.  I reviewed the pulmonary sections of a recently performed coronary CT angiogram which shows no parenchymal disease.  I think her dyspnea is mostly deconditioning and obesity.  I have encouraged her to exercise more.  She needs to follow-up with cardiology as I am a little concerned that she describes chest pressure with exertion.  If she does have sleep disordered breathing it is probably minor and could also be improved by weight loss.\par \par I will see her again as needed.

## 2022-10-24 NOTE — HISTORY OF PRESENT ILLNESS
[TextBox_4] : 04/27/2022 :  ELIDA ONTIVEROS is a 56 year old female who is being evaluated for dyspnea on exertion.  Complains increasing dyspnea past 6 months, sometimes at rest, mostly exertional.  Can climb two flights of stairs or walk 3 blocks before getting dyspneic.  Sometimes accompanied by cough or wheeze.  No hx of asthma, inhaler use, smoking.  Has hx coronary disease with stenting.  Her dyspnea is sometimes accompanied by feeling of chest tightness or light headedness.  IN past on lisinopril, stopped for cough, on beta blocker (metoprolol)\par \par Also notes poor sleep.  Sleeps supine after neck surgery. Bed 1030, latency 20 min, 2-3 wakes, up at 430 for work.  Often sleepy at work  Snoring noted.  Had unattended home sleep testing 5-6 yrs ago with some obstructive sleep apnea, rx not recommended.  No recent wt change. \par \par 10/24/2022: Since last seen canceled a number of appointments for testing and follow-up.  Unable to do home sleep study because of expense.  Complains of some dyspnea on exertion without cough or wheezing, with a feeling of pressure across the anterior chest.  Pulmonary function testing today looks restricted FEV1/FVC 82%, normal, total lung capacity used, ERV severely reduced, diffusion capacity reduced but normal when corrected for alveolar volume.

## 2023-03-07 ENCOUNTER — TRANSCRIPTION ENCOUNTER (OUTPATIENT)
Age: 58
End: 2023-03-07

## 2023-03-07 ENCOUNTER — NON-APPOINTMENT (OUTPATIENT)
Age: 58
End: 2023-03-07

## 2023-03-08 ENCOUNTER — TRANSCRIPTION ENCOUNTER (OUTPATIENT)
Age: 58
End: 2023-03-08

## 2023-03-27 ENCOUNTER — NON-APPOINTMENT (OUTPATIENT)
Age: 58
End: 2023-03-27

## 2023-03-27 ENCOUNTER — APPOINTMENT (OUTPATIENT)
Dept: HEART AND VASCULAR | Facility: CLINIC | Age: 58
End: 2023-03-27
Payer: COMMERCIAL

## 2023-03-27 VITALS
WEIGHT: 157 LBS | DIASTOLIC BLOOD PRESSURE: 61 MMHG | HEART RATE: 67 BPM | TEMPERATURE: 97.3 F | OXYGEN SATURATION: 100 % | BODY MASS INDEX: 28.89 KG/M2 | HEIGHT: 62 IN | SYSTOLIC BLOOD PRESSURE: 107 MMHG

## 2023-03-27 PROCEDURE — 99214 OFFICE O/P EST MOD 30 MIN: CPT | Mod: 25

## 2023-03-27 PROCEDURE — 93000 ELECTROCARDIOGRAM COMPLETE: CPT

## 2023-03-27 PROCEDURE — 36415 COLL VENOUS BLD VENIPUNCTURE: CPT

## 2023-03-27 NOTE — HISTORY OF PRESENT ILLNESS
[FreeTextEntry1] : 55 y/o female with h/o htn, hl, predm, family h/o early cvd, overweight, gerd who presents for f/up\par \par Last seen  for sob/left arm numbness\par \par sent for CTA/Echo \par \par CTA : mid LAD stent patent, nonobstructive dLAD \par Echo : ef 60-65%, no wma, borderline prolapse leaflets, mild to mod MR, trace ai/tr\par \par \par Notes continued intermittent cp and sob w exertion\par no  palpitations, syncope, edema, orthopnea, pnd\par \par saw Pulm last year - had pft's\par \par  \par \par Norvasc dc'd due to concern over edema and lisinopril 5 mg daily started\par Noted a cough on lisinopril, so changed to losartan which was recalled\par then placed on olmesartan\par HR noted to be low on day of echo and BB dose decreased to 50 daily\par \par Holter : avg hr 62, 27 VE, 13 SVE\par \par \par CTA : patent mid LAD stent, prox/mid LCx minimal stenosis, RCA normal\par \par Echo : normal lv size/thickness, ef 65%, trace pr/mr/tr, normal pap, normal ivc\par \par KASSANDRA: : neg for dvt\par \par \par Seen for cp and had CTA  with severe stenosis noncalcified plaque mid LAD\par \par \par Sent for cath 18 LM nl, pLAD 80% FFR 0.75 - s/p LISA Promus pLAD, normal Lcx/RCA, ef 65%, no as/mr, edp 11\par bifurcation, Successful LISA prox LAD, LCx & RCA normal, EF normal.\par \par Echo : normal lv size/thickness, no wma, ef 60-65%, no valvular disease\par \par  \par \par CAIN w doppler:  - normal\par \par \par HTN diagnosed \par HL diagnosed \par \par Had 1 ectopic and 2 miscarriages\par \par Started menopause age 45\par \par \par \par PMH/PSH:\par htn\par hl\par overweight\par predm\par c spine surgery \par fibroid surgery \par tonsillectomy 1969\par gerd\par rosacea\par cad s/p LISA pLAD \par nephrolithiasis\par \par \par \par ALL:\par pcn - facial/throat swelling\par eggs\par \par \par SH:\par no tobacco\par social etoh\par no drugs\par from [place]\par \par no kids\par 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

## 2023-03-27 NOTE — DISCUSSION/SUMMARY
[Patient] : the patient [EKG obtained to assist in diagnosis and management of assessed problem(s)] : EKG obtained to assist in diagnosis and management of assessed problem(s) [___ Month(s)] : in [unfilled] month(s) [FreeTextEntry1] : 58 y/o female with h/o htn, hl, predm, family h/o early cvd, gerd, overweight, cad s/p LISA pLAD 2018 who presents for f/up  \par \par -refer for cath given persistent sob w exertion/cp\par -ordered echo for MR\par -CTA 4/22: mid LAD stent patent, nonobstructive dLAD \par -Echo 2022: ef 60-65%, no wma, borderline prolapse leaflets, mild to mod MR, trace ai/tr\par -continue olmesartan 40 qd\par -continue hctz 12.5 mg qd\par -continue metoprolol 50 qd\par -advised she f/up with cspine surgeon given left arm numbness/tingling\par -holter 9/19: avg hr 62, 27 VE, 13 SVE\par -CTA 8/19: patent mid LAD stent, prox/mid LCx minimal stenosis, RCA normal\par -Echo 8/19: normal lv size/thickness, ef 65%, trace pr/mr/tr, normal pap, normal ivc\par -KASSANDRA: 8/19: neg for dvt\par -ekg ordered today - sb, normal intervals, no st/t changes\par -labs 2020 reviewed, ordered labs today\par -CTA 9/18 with severe stenosis noncalcified plaque mid LAD\par -Echo 9/18: normal lv size/thickness, no wma, ef 60-65%, no valvular disease\par -continue asa, plavix and lipitor \par -cath 9/20/18 LM nl, pLAD 80% s/p PROMUS LISA, Lcx/Rca normal, ef 65%\par bifurcation, Successful LISA prox LAD, LCx & RCA normal, EF normal. \par -CAIN w doppler 12/18: normal\par -pulm recs\par -gi referral\par -counseled on cvd risk factors\par -f/up 3 months for cad, htn, cp/sob\par \par I have spent 30 minutes reviewing records, labs, tests and discussing importance of medication for htn and cp/sob evaluation. \par

## 2023-03-29 DIAGNOSIS — Z01.818 ENCOUNTER FOR OTHER PREPROCEDURAL EXAMINATION: ICD-10-CM

## 2023-03-29 LAB
ALBUMIN SERPL ELPH-MCNC: 4.6 G/DL
ALP BLD-CCNC: 98 U/L
ALT SERPL-CCNC: 15 U/L
ANION GAP SERPL CALC-SCNC: 14 MMOL/L
APPEARANCE: CLEAR
AST SERPL-CCNC: 22 U/L
BACTERIA: NEGATIVE
BASOPHILS # BLD AUTO: 0.05 K/UL
BASOPHILS NFR BLD AUTO: 0.6 %
BILIRUB SERPL-MCNC: 0.3 MG/DL
BILIRUBIN URINE: NEGATIVE
BLOOD URINE: NEGATIVE
BUN SERPL-MCNC: 15 MG/DL
CALCIUM SERPL-MCNC: 10.4 MG/DL
CHLORIDE SERPL-SCNC: 103 MMOL/L
CHOLEST SERPL-MCNC: 137 MG/DL
CO2 SERPL-SCNC: 26 MMOL/L
COLOR: NORMAL
CREAT SERPL-MCNC: 0.93 MG/DL
CREAT SPEC-SCNC: 59 MG/DL
EGFR: 71 ML/MIN/1.73M2
EOSINOPHIL # BLD AUTO: 0.34 K/UL
EOSINOPHIL NFR BLD AUTO: 4.1 %
ESTIMATED AVERAGE GLUCOSE: 123 MG/DL
GLUCOSE QUALITATIVE U: NEGATIVE
GLUCOSE SERPL-MCNC: 94 MG/DL
HBA1C MFR BLD HPLC: 5.9 %
HCT VFR BLD CALC: 36.3 %
HDLC SERPL-MCNC: 45 MG/DL
HGB BLD-MCNC: 11.8 G/DL
HYALINE CASTS: 0 /LPF
IMM GRANULOCYTES NFR BLD AUTO: 0.2 %
KETONES URINE: NEGATIVE
LDLC SERPL CALC-MCNC: 69 MG/DL
LEUKOCYTE ESTERASE URINE: NEGATIVE
LYMPHOCYTES # BLD AUTO: 1.72 K/UL
LYMPHOCYTES NFR BLD AUTO: 20.8 %
MAGNESIUM SERPL-MCNC: 1.5 MG/DL
MAN DIFF?: NORMAL
MCHC RBC-ENTMCNC: 31.8 PG
MCHC RBC-ENTMCNC: 32.5 GM/DL
MCV RBC AUTO: 97.8 FL
MICROALBUMIN 24H UR DL<=1MG/L-MCNC: <1.2 MG/DL
MICROALBUMIN/CREAT 24H UR-RTO: NORMAL MG/G
MICROSCOPIC-UA: NORMAL
MONOCYTES # BLD AUTO: 0.52 K/UL
MONOCYTES NFR BLD AUTO: 6.3 %
NEUTROPHILS # BLD AUTO: 5.61 K/UL
NEUTROPHILS NFR BLD AUTO: 68 %
NITRITE URINE: NEGATIVE
NONHDLC SERPL-MCNC: 92 MG/DL
PH URINE: 7.5
PLATELET # BLD AUTO: 274 K/UL
POTASSIUM SERPL-SCNC: 3.9 MMOL/L
PROT SERPL-MCNC: 7.5 G/DL
PROTEIN URINE: NEGATIVE
RBC # BLD: 3.71 M/UL
RBC # FLD: 12.7 %
RED BLOOD CELLS URINE: 2 /HPF
SODIUM SERPL-SCNC: 142 MMOL/L
SPECIFIC GRAVITY URINE: 1.01
SQUAMOUS EPITHELIAL CELLS: 0 /HPF
TRIGL SERPL-MCNC: 114 MG/DL
TSH SERPL-ACNC: 1.28 UIU/ML
UROBILINOGEN URINE: NORMAL
WBC # FLD AUTO: 8.26 K/UL
WHITE BLOOD CELLS URINE: 0 /HPF

## 2023-03-29 RX ORDER — OMEGA-3/DHA/EPA/FISH OIL 300-1000MG
400 CAPSULE ORAL
Qty: 90 | Refills: 1 | Status: ACTIVE | COMMUNITY
Start: 2023-03-29 | End: 1900-01-01

## 2023-03-31 ENCOUNTER — TRANSCRIPTION ENCOUNTER (OUTPATIENT)
Age: 58
End: 2023-03-31

## 2023-04-10 ENCOUNTER — NON-APPOINTMENT (OUTPATIENT)
Age: 58
End: 2023-04-10

## 2023-04-14 RX ORDER — CHLORHEXIDINE GLUCONATE 213 G/1000ML
1 SOLUTION TOPICAL ONCE
Refills: 0 | Status: DISCONTINUED | OUTPATIENT
Start: 2023-04-20 | End: 2023-05-04

## 2023-04-14 NOTE — H&P ADULT - RESPIRATORY
normal/clear to auscultation bilaterally/no wheezes/no rales/no rhonchi/no respiratory distress/good air movement/respirations non-labored

## 2023-04-14 NOTE — H&P ADULT - ASSESSMENT
56 year old female with Family history of early CVD, PMHX of CAD ( s./p cath with LISA to pLAD),  HTN, HLD,  pre DM, GERD,  who presented to their outpatient cardiologist Dr. Key complaining of CP intermittent mild substernal chest pressure, with [place] and left arm numbness.  Patient underwent ECHO 2022 EF 60-65% no wma, borderline prolapse leaflets, mild to mod MR trace AI/TR. Patient underwent CCTA 4/2022 mLAD stent patent non-obstrutive CAD. Pt denies  dizziness, palpitations, orthopnea/PND, leg swelling, LOC, bleeding, melena/hematochezia, fever, chills, URI symptoms, or recent illness.  In light of pts risk factors, known CAD, and persistent symptoms, pt now presents to [facility] for recommended cardiac catheterization with possible intervention if clinically indicated.      - ASA III; Mallampati III.   - VSS.   - Pt is a candidate for moderate sedation.   - LOAD: no load; pt took home ASA 81mg PO QD and PLavix 75mg PO QD at home today.   - FLUIDS: NS 250cc IV bolus x1.     Risks & benefits of procedure and alternative therapy have been explained to the patient including but not limited to: allergic reaction, bleeding w/possible need for blood transfusion, infection, renal and vascular compromise, limb damage, arrhythmia, stroke, vessel dissection/perforation, Myocardial infarction, emergent CABG. Informed consent obtained and in chart.

## 2023-04-14 NOTE — H&P ADULT - NSHPLABSRESULTS_GEN_ALL_CORE
11.7   6.17  )-----------( 278      ( 20 Apr 2023 06:49 )             36.1     140  |  102  |  21  ----------------------------<  108<H>  3.9   |  27  |  1.01    Ca    10.3      20 Apr 2023 06:49    Mg     1.7     04-20    TPro  7.5  /  Alb  4.3  /  TBili  0.3  /  DBili  x   /  AST  22  /  ALT  17  /  AlkPhos  108  04-20    PT/INR - ( 20 Apr 2023 06:49 )   PT: 12.2 sec;   INR: 1.02        PTT - ( 20 Apr 2023 06:49 )  PTT:32.9 sec    CARDIAC MARKERS ( 20 Apr 2023 06:49 )  x     / x     / 129 U/L / x     / 2.6 ng/mL    EKG: NSR no acute ischemic changes noted.

## 2023-04-14 NOTE — H&P ADULT - HISTORY OF PRESENT ILLNESS
Cardio: Dr. Krishnamurthy   Pharmacy:   COVID:  Escort:     56 year old female with Family history of early CVD, PMHX of CAD ( s./p cath with LISA to pLAD),  HTN, HLD,  pre DM, GERD,  who presented to their outpatient cardiologist Dr. Krishnamurthy complaining of CP intermittent mild substernal chest pressure, with MONROE and left arm numbness.  Patient underwent ECHO with , Patient underwent CCTA with 4/22 mLAD stent patent non-obstrutive CAD. Pt denies  dizziness, palpitations, orthopnea/PND, leg swelling, LOC, bleeding, melena/hematochezia, fever, chills, URI symptoms, or recent illness.  In light of pts risk factors, known CAD, and persistent symptoms, pt now presents to [facility] for recommended cardiac catheterization with possible intervention if clinically indicated.      Cath 2019 with: pLAD 80%, FFR .75, LM normal, normal LCX/RCA     Cardio: Dr. Krishnamurthy   Pharmacy:   COVID:  Escort:     56 year old female with Family history of early CVD, PMHX of CAD ( s./p cath with LISA to pLAD),  HTN, HLD,  pre DM, GERD,  who presented to their outpatient cardiologist Dr. Krishnamurthy complaining of CP intermittent mild substernal chest pressure, with MONROE and left arm numbness.  Patient underwent ECHO 2022, EF 60-65% no wma, borderline prolapse leaflets, mild to mod MR trace AI/TR. Patient underwent CCTA with 4/22 mLAD stent patent non-obstrutive CAD. Pt denies  dizziness, palpitations, orthopnea/PND, leg swelling, LOC, bleeding, melena/hematochezia, fever, chills, URI symptoms, or recent illness.  In light of pts risk factors, known CAD, and persistent symptoms, pt now presents to [facility] for recommended cardiac catheterization with possible intervention if clinically indicated.      Cath 2019 with: LISA x1 pLAD 80%, FFR .75, LM normal, normal LCX/RCA     Cardio: Dr. Krishnamurthy   Pharmacy:   Escort:     56 year old female with Family history of early CVD, PMHX of CAD ( s./p cath with LISA to pLAD),  HTN, HLD,  pre DM, GERD,  who presented to their outpatient cardiologist Dr. Krishnamurthy complaining of CP intermittent mild substernal chest pressure, with MONROE and left arm numbness.  Patient underwent ECHO 2022 EF 60-65% no wma, borderline prolapse leaflets, mild to mod MR trace AI/TR. Patient underwent CCTA 4/2022 mLAD stent patent non-obstrutive CAD. Pt denies  dizziness, palpitations, orthopnea/PND, leg swelling, LOC, bleeding, melena/hematochezia, fever, chills, URI symptoms, or recent illness.  In light of pts risk factors, known CAD, and persistent symptoms, pt now presents to [facility] for recommended cardiac catheterization with possible intervention if clinically indicated.        Cath 2019 with: LISA x1 pLAD 80%, FFR .75, LM normal, normal LCX/RCA     Cardio: Dr. Key  Pharmacy:   Escort:     56 year old female with Family history of early CVD, PMHX of CAD ( s./p cath with LISA to pLAD),  HTN, HLD,  pre DM, GERD,  who presented to their outpatient cardiologist Dr. Key complaining of CP intermittent mild substernal chest pressure, with MONROE and left arm numbness.  Patient underwent ECHO 2022 EF 60-65% no wma, borderline prolapse leaflets, mild to mod MR trace AI/TR. Patient underwent CCTA 4/2022 mLAD stent patent non-obstrutive CAD. Pt denies  dizziness, palpitations, orthopnea/PND, leg swelling, LOC, bleeding, melena/hematochezia, fever, chills, URI symptoms, or recent illness.  In light of pts risk factors, known CAD, and persistent symptoms, pt now presents to [facility] for recommended cardiac catheterization with possible intervention if clinically indicated.        Cath 2019 with: LISA x1 pLAD 80%, FFR .75, LM normal, normal LCX/RCA

## 2023-04-14 NOTE — H&P ADULT - NSICDXPASTMEDICALHX_GEN_ALL_CORE_FT
PAST MEDICAL HISTORY:  CAD (coronary artery disease)     GERD (gastroesophageal reflux disease)     Rosacea

## 2023-04-20 ENCOUNTER — OUTPATIENT (OUTPATIENT)
Dept: OUTPATIENT SERVICES | Facility: HOSPITAL | Age: 58
LOS: 1 days | Discharge: HOME | End: 2023-04-20
Payer: COMMERCIAL

## 2023-04-20 DIAGNOSIS — Z90.89 ACQUIRED ABSENCE OF OTHER ORGANS: Chronic | ICD-10-CM

## 2023-04-20 DIAGNOSIS — Z98.89 OTHER SPECIFIED POSTPROCEDURAL STATES: Chronic | ICD-10-CM

## 2023-04-20 DIAGNOSIS — Z90.49 ACQUIRED ABSENCE OF OTHER SPECIFIED PARTS OF DIGESTIVE TRACT: Chronic | ICD-10-CM

## 2023-04-20 DIAGNOSIS — D25.9 LEIOMYOMA OF UTERUS, UNSPECIFIED: Chronic | ICD-10-CM

## 2023-04-20 LAB
A1C WITH ESTIMATED AVERAGE GLUCOSE RESULT: 6 % — HIGH (ref 4–5.6)
ALBUMIN SERPL ELPH-MCNC: 4.3 G/DL — SIGNIFICANT CHANGE UP (ref 3.3–5)
ALP SERPL-CCNC: 108 U/L — SIGNIFICANT CHANGE UP (ref 40–120)
ALT FLD-CCNC: 17 U/L — SIGNIFICANT CHANGE UP (ref 10–45)
ANION GAP SERPL CALC-SCNC: 11 MMOL/L — SIGNIFICANT CHANGE UP (ref 5–17)
APTT BLD: 32.9 SEC — SIGNIFICANT CHANGE UP (ref 27.5–35.5)
AST SERPL-CCNC: 22 U/L — SIGNIFICANT CHANGE UP (ref 10–40)
BASOPHILS # BLD AUTO: 0.02 K/UL — SIGNIFICANT CHANGE UP (ref 0–0.2)
BASOPHILS NFR BLD AUTO: 0.3 % — SIGNIFICANT CHANGE UP (ref 0–2)
BILIRUB SERPL-MCNC: 0.3 MG/DL — SIGNIFICANT CHANGE UP (ref 0.2–1.2)
BUN SERPL-MCNC: 21 MG/DL — SIGNIFICANT CHANGE UP (ref 7–23)
CALCIUM SERPL-MCNC: 10.3 MG/DL — SIGNIFICANT CHANGE UP (ref 8.4–10.5)
CHLORIDE SERPL-SCNC: 102 MMOL/L — SIGNIFICANT CHANGE UP (ref 96–108)
CHOLEST SERPL-MCNC: 139 MG/DL — SIGNIFICANT CHANGE UP
CK MB CFR SERPL CALC: 2.6 NG/ML — SIGNIFICANT CHANGE UP (ref 0–6.7)
CK SERPL-CCNC: 129 U/L — SIGNIFICANT CHANGE UP (ref 25–170)
CO2 SERPL-SCNC: 27 MMOL/L — SIGNIFICANT CHANGE UP (ref 22–31)
CREAT SERPL-MCNC: 1.01 MG/DL — SIGNIFICANT CHANGE UP (ref 0.5–1.3)
EGFR: 64 ML/MIN/1.73M2 — SIGNIFICANT CHANGE UP
EOSINOPHIL # BLD AUTO: 0.3 K/UL — SIGNIFICANT CHANGE UP (ref 0–0.5)
EOSINOPHIL NFR BLD AUTO: 4.9 % — SIGNIFICANT CHANGE UP (ref 0–6)
ESTIMATED AVERAGE GLUCOSE: 126 MG/DL — HIGH (ref 68–114)
GLUCOSE SERPL-MCNC: 108 MG/DL — HIGH (ref 70–99)
HCT VFR BLD CALC: 36.1 % — SIGNIFICANT CHANGE UP (ref 34.5–45)
HDLC SERPL-MCNC: 47 MG/DL — LOW
HGB BLD-MCNC: 11.7 G/DL — SIGNIFICANT CHANGE UP (ref 11.5–15.5)
IMM GRANULOCYTES NFR BLD AUTO: 0.2 % — SIGNIFICANT CHANGE UP (ref 0–0.9)
INR BLD: 1.02 — SIGNIFICANT CHANGE UP (ref 0.88–1.16)
LIPID PNL WITH DIRECT LDL SERPL: 65 MG/DL — SIGNIFICANT CHANGE UP
LYMPHOCYTES # BLD AUTO: 1.69 K/UL — SIGNIFICANT CHANGE UP (ref 1–3.3)
LYMPHOCYTES # BLD AUTO: 27.4 % — SIGNIFICANT CHANGE UP (ref 13–44)
MAGNESIUM SERPL-MCNC: 1.7 MG/DL — SIGNIFICANT CHANGE UP (ref 1.6–2.6)
MCHC RBC-ENTMCNC: 31.4 PG — SIGNIFICANT CHANGE UP (ref 27–34)
MCHC RBC-ENTMCNC: 32.4 GM/DL — SIGNIFICANT CHANGE UP (ref 32–36)
MCV RBC AUTO: 96.8 FL — SIGNIFICANT CHANGE UP (ref 80–100)
MONOCYTES # BLD AUTO: 0.42 K/UL — SIGNIFICANT CHANGE UP (ref 0–0.9)
MONOCYTES NFR BLD AUTO: 6.8 % — SIGNIFICANT CHANGE UP (ref 2–14)
NEUTROPHILS # BLD AUTO: 3.73 K/UL — SIGNIFICANT CHANGE UP (ref 1.8–7.4)
NEUTROPHILS NFR BLD AUTO: 60.4 % — SIGNIFICANT CHANGE UP (ref 43–77)
NON HDL CHOLESTEROL: 92 MG/DL — SIGNIFICANT CHANGE UP
NRBC # BLD: 0 /100 WBCS — SIGNIFICANT CHANGE UP (ref 0–0)
PLATELET # BLD AUTO: 278 K/UL — SIGNIFICANT CHANGE UP (ref 150–400)
POTASSIUM SERPL-MCNC: 3.9 MMOL/L — SIGNIFICANT CHANGE UP (ref 3.5–5.3)
POTASSIUM SERPL-SCNC: 3.9 MMOL/L — SIGNIFICANT CHANGE UP (ref 3.5–5.3)
PROT SERPL-MCNC: 7.5 G/DL — SIGNIFICANT CHANGE UP (ref 6–8.3)
PROTHROM AB SERPL-ACNC: 12.2 SEC — SIGNIFICANT CHANGE UP (ref 10.5–13.4)
RBC # BLD: 3.73 M/UL — LOW (ref 3.8–5.2)
RBC # FLD: 12.3 % — SIGNIFICANT CHANGE UP (ref 10.3–14.5)
SODIUM SERPL-SCNC: 140 MMOL/L — SIGNIFICANT CHANGE UP (ref 135–145)
TRIGL SERPL-MCNC: 135 MG/DL — SIGNIFICANT CHANGE UP
WBC # BLD: 6.17 K/UL — SIGNIFICANT CHANGE UP (ref 3.8–10.5)
WBC # FLD AUTO: 6.17 K/UL — SIGNIFICANT CHANGE UP (ref 3.8–10.5)

## 2023-04-20 PROCEDURE — 80061 LIPID PANEL: CPT

## 2023-04-20 PROCEDURE — 85025 COMPLETE CBC W/AUTO DIFF WBC: CPT

## 2023-04-20 PROCEDURE — 82553 CREATINE MB FRACTION: CPT

## 2023-04-20 PROCEDURE — 93010 ELECTROCARDIOGRAM REPORT: CPT

## 2023-04-20 PROCEDURE — 93458 L HRT ARTERY/VENTRICLE ANGIO: CPT

## 2023-04-20 PROCEDURE — C1894: CPT

## 2023-04-20 PROCEDURE — C1769: CPT

## 2023-04-20 PROCEDURE — C1887: CPT

## 2023-04-20 PROCEDURE — 93458 L HRT ARTERY/VENTRICLE ANGIO: CPT | Mod: 26

## 2023-04-20 PROCEDURE — 85610 PROTHROMBIN TIME: CPT

## 2023-04-20 PROCEDURE — 80053 COMPREHEN METABOLIC PANEL: CPT

## 2023-04-20 PROCEDURE — 82550 ASSAY OF CK (CPK): CPT

## 2023-04-20 PROCEDURE — 93005 ELECTROCARDIOGRAM TRACING: CPT

## 2023-04-20 PROCEDURE — 83036 HEMOGLOBIN GLYCOSYLATED A1C: CPT

## 2023-04-20 PROCEDURE — 85347 COAGULATION TIME ACTIVATED: CPT

## 2023-04-20 PROCEDURE — 83735 ASSAY OF MAGNESIUM: CPT

## 2023-04-20 PROCEDURE — 85730 THROMBOPLASTIN TIME PARTIAL: CPT

## 2023-04-20 RX ORDER — ONDANSETRON 8 MG/1
4 TABLET, FILM COATED ORAL ONCE
Refills: 0 | Status: COMPLETED | OUTPATIENT
Start: 2023-04-20 | End: 2023-04-20

## 2023-04-20 RX ORDER — SODIUM CHLORIDE 9 MG/ML
500 INJECTION INTRAMUSCULAR; INTRAVENOUS; SUBCUTANEOUS
Refills: 0 | Status: DISCONTINUED | OUTPATIENT
Start: 2023-04-20 | End: 2023-05-04

## 2023-04-20 RX ORDER — SODIUM CHLORIDE 9 MG/ML
250 INJECTION INTRAMUSCULAR; INTRAVENOUS; SUBCUTANEOUS ONCE
Refills: 0 | Status: COMPLETED | OUTPATIENT
Start: 2023-04-20 | End: 2023-04-20

## 2023-04-20 RX ORDER — HYDROCHLOROTHIAZIDE 25 MG
1 TABLET ORAL
Refills: 0 | DISCHARGE

## 2023-04-20 RX ORDER — METOPROLOL TARTRATE 50 MG
1 TABLET ORAL
Refills: 0 | DISCHARGE

## 2023-04-20 RX ORDER — PHENYLEPHRINE HYDROCHLORIDE 10 MG/ML
100 INJECTION INTRAVENOUS ONCE
Refills: 0 | Status: COMPLETED | OUTPATIENT
Start: 2023-04-20 | End: 2023-04-20

## 2023-04-20 RX ORDER — ATROPINE SULFATE 0.1 MG/ML
0.5 SYRINGE (ML) INJECTION ONCE
Refills: 0 | Status: DISCONTINUED | OUTPATIENT
Start: 2023-04-20 | End: 2023-04-20

## 2023-04-20 RX ORDER — METRONIDAZOLE 7.5 MG/G
1 GEL VAGINAL
Qty: 0 | Refills: 0 | DISCHARGE

## 2023-04-20 RX ORDER — ONDANSETRON 8 MG/1
4 TABLET, FILM COATED ORAL ONCE
Refills: 0 | Status: DISCONTINUED | OUTPATIENT
Start: 2023-04-20 | End: 2023-04-20

## 2023-04-20 RX ORDER — ATROPINE SULFATE 0.1 MG/ML
0.5 SYRINGE (ML) INJECTION ONCE
Refills: 0 | Status: COMPLETED | OUTPATIENT
Start: 2023-04-20 | End: 2023-04-20

## 2023-04-20 RX ORDER — OLMESARTAN MEDOXOMIL 5 MG/1
1 TABLET, FILM COATED ORAL
Refills: 0 | DISCHARGE

## 2023-04-20 RX ADMIN — Medication 0.5 MILLIGRAM(S): at 10:40

## 2023-04-20 RX ADMIN — PHENYLEPHRINE HYDROCHLORIDE 100 MICROGRAM(S): 10 INJECTION INTRAVENOUS at 10:40

## 2023-04-20 RX ADMIN — SODIUM CHLORIDE 1000 MILLILITER(S): 9 INJECTION INTRAMUSCULAR; INTRAVENOUS; SUBCUTANEOUS at 08:03

## 2023-04-20 RX ADMIN — SODIUM CHLORIDE 210 MILLILITER(S): 9 INJECTION INTRAMUSCULAR; INTRAVENOUS; SUBCUTANEOUS at 09:25

## 2023-04-20 RX ADMIN — ONDANSETRON 4 MILLIGRAM(S): 8 TABLET, FILM COATED ORAL at 10:40

## 2023-04-20 NOTE — PROGRESS NOTE ADULT - SUBJECTIVE AND OBJECTIVE BOX
Interventional Cardiology PA Post PCI SDA Discharge Note    Patient without complaints. Ambulated and voided without difficulties    Afebrile, VSS    PRESCRIPTIONS/HOME MEDICATIONS:  Aspirin Enteric Coated 81 mg oral delayed release tablet: 1 tab(s) orally once a day  atorvastatin 40 mg oral tablet: 1 tab(s) orally once a day (at bedtime)  clopidogrel 75 mg oral tablet: 1 tab(s) orally once a day  hydroCHLOROthiazide 12.5 mg oral capsule: 1 orally  olmesartan 40 mg oral tablet: 1 orally  omeprazole 20 mg oral delayed release capsule: 1 cap(s) orally once a day  Toprol-XL 50 mg oral tablet, extended release: 1 orally once a day      CURRENT MEDICATIONS:   chlorhexidine 4% Liquid 1 Application(s) Topical once  sodium chloride 0.9%. 500 milliLiter(s) IV Continuous <Continuous>        Ext: RIght radial: No  hematoma,  no   bleeding, dressing; C/D/I      Pulses:    intact RAD    A/P:  56 year old female with Family history of early CVD, PMHX of CAD ( s./p cath with LISA to pLAD),  HTN, HLD,  pre DM, GERD,  who presented to their outpatient cardiologist Dr. Key complaining of CP intermittent mild substernal chest pressure, with MONROE and left arm numbness.  Patient underwent ECHO 2022 EF 60-65% no wma, borderline prolapse leaflets, mild to mod MR trace AI/TR. Patient underwent CCTA 4/2022 mLAD stent patent non-obstrutive CAD. Pt denies  dizziness, palpitations, orthopnea/PND, leg swelling, LOC, bleeding, melena/hematochezia, fever, chills, URI symptoms, or recent illness.  In light of pts risk factors, known CAD, and persistent symptoms, pt now presents to [facility] for recommended cardiac catheterization with possible intervention if clinically indicated.      Patient is s/p cardiac cath 4/20/2023: RCA: small, dominant, mild luminal irregularities, LM: normal, LCx: dominant, minimal luminal irregularities, pLAD: patent stent, m/dLAD: mild luminal irregularities. EDP: 11, EF: 60-65% as per echo. R TR at 10:30 PM     1.	  Follow-up with Cardiologist, Dr. Key in 72 hours.  2.       Post procedure labs/EKG reviewed and stable.    3.       Pt given instructions on importance of taking antiplatelet medication.    4. 	   Stable for discharge as per attending Dr. Krishnamurthy, after bed rest, pt voids, groin/wrist stable and 30 minutes of ambulation.

## 2023-04-20 NOTE — CHART NOTE - NSCHARTNOTEFT_GEN_A_CORE
PA called to bedside as patient with increased nausea and bradycardia. Patient's BP 83/40 mmHg and Hr in the low 40's. Patient placed in trendelenberg, fluids opened. Patient given Atropine 0.5 mg IVP x 1, Alexis 100 mcg IVP x 1 and Zofran 4.0 mg IVP x1. Patient's hr improved to the 60s and BP improved to SBP 120s. Repeat EKG performed - no acute ST-T wave changes. Will continue to monitor.

## 2023-04-21 DIAGNOSIS — Z95.5 PRESENCE OF CORONARY ANGIOPLASTY IMPLANT AND GRAFT: ICD-10-CM

## 2023-04-21 DIAGNOSIS — I25.110 ATHEROSCLEROTIC HEART DISEASE OF NATIVE CORONARY ARTERY WITH UNSTABLE ANGINA PECTORIS: ICD-10-CM

## 2023-04-21 DIAGNOSIS — R94.39 ABNORMAL RESULT OF OTHER CARDIOVASCULAR FUNCTION STUDY: ICD-10-CM

## 2023-04-21 DIAGNOSIS — Z82.49 FAMILY HISTORY OF ISCHEMIC HEART DISEASE AND OTHER DISEASES OF THE CIRCULATORY SYSTEM: ICD-10-CM

## 2023-05-02 LAB — ISTAT ACTK (ACTIVATED CLOTTING TIME KAOLIN): 317 SEC — HIGH (ref 74–137)

## 2023-05-11 ENCOUNTER — APPOINTMENT (OUTPATIENT)
Dept: HEART AND VASCULAR | Facility: CLINIC | Age: 58
End: 2023-05-11
Payer: COMMERCIAL

## 2023-05-11 ENCOUNTER — APPOINTMENT (OUTPATIENT)
Dept: PULMONOLOGY | Facility: CLINIC | Age: 58
End: 2023-05-11
Payer: COMMERCIAL

## 2023-05-11 ENCOUNTER — NON-APPOINTMENT (OUTPATIENT)
Age: 58
End: 2023-05-11

## 2023-05-11 VITALS
BODY MASS INDEX: 28.71 KG/M2 | HEART RATE: 54 BPM | DIASTOLIC BLOOD PRESSURE: 54 MMHG | OXYGEN SATURATION: 96 % | TEMPERATURE: 97.3 F | WEIGHT: 156 LBS | SYSTOLIC BLOOD PRESSURE: 96 MMHG | HEIGHT: 62 IN

## 2023-05-11 VITALS
BODY MASS INDEX: 29.08 KG/M2 | DIASTOLIC BLOOD PRESSURE: 76 MMHG | SYSTOLIC BLOOD PRESSURE: 116 MMHG | HEIGHT: 61.42 IN | OXYGEN SATURATION: 100 % | WEIGHT: 156 LBS | HEART RATE: 60 BPM | TEMPERATURE: 96.4 F

## 2023-05-11 PROCEDURE — 99214 OFFICE O/P EST MOD 30 MIN: CPT | Mod: 25

## 2023-05-11 PROCEDURE — 99215 OFFICE O/P EST HI 40 MIN: CPT

## 2023-05-11 PROCEDURE — 93000 ELECTROCARDIOGRAM COMPLETE: CPT

## 2023-05-11 RX ORDER — NITROGLYCERIN 0.4 MG/1
0.4 TABLET SUBLINGUAL
Qty: 25 | Refills: 3 | Status: ACTIVE | COMMUNITY
Start: 2019-10-07 | End: 1900-01-01

## 2023-05-11 NOTE — PROCEDURE
[FreeTextEntry1] : PFT 10/24/22\par FVC: 2.20 L (73%)\par FEV1: 1.79 L (75%)\par FEV1/FVC: 82%\par FEF 25-75%: 2.04 L/s (84%)\par TLC: 3.11 L (68%)\par RV/T%\par DLCO: 11.74 (54%)\par \par Coronary CT 22:\par Non Cardiac Findings: Limited CT of the CHEST with and without contrast TECHNIQUE: CT of the medial aspects of the mid to lower chest was obtained in conjunction with a coronary CT angiogram. The coronary CT angiogram is dictated separately. This report pertains solely to the non-coronary portions of the study. PRIOR STUDIES: 2019 FINDINGS: The heart is normal in size. No pericardial effusion is seen. The visualized portions of the aorta are unremarkable. The visualized portions of the pulmonary arteries are unremarkable. No gross mediastinal or hilar adenopathy identified. No pleural effusions are evident. No pulmonary abnormalities are seen. Simple right hepatic dome cyst. Small type I hiatal hernia. Mild degenerative osseous changes. IMPRESSION: Cardiac: 1. Normal Left main and LCX. 2. Patent stent in the mid LAD. 3. Nonobstructive disease in distal LAD. 4. Probably normal mid RCA and RPDA. Non-cardiac: 1. No significant findings \par

## 2023-05-11 NOTE — HISTORY OF PRESENT ILLNESS
[Never] : never [TextBox_4] : 57 year old female pmhx (s/p stent 2018, on clopidogrel), presents with shortness of breath\par referred by Dr. Key\par Previously seen by Dr. Fulton\par For the past year she has been having dyspnea on exertion\par While walking flat she can walk 4-5 blocks\par walking up stairs, 2-3 flights, she can feels chest tightness, heaviness, pinch, cough, wheeze\par She had COVID June 2022. She had fevers, congestion. She was sick for two weeks\par Her mother had a blood clot\par No family history of lung cancer\par Her uncle had asthma, two nieces with severe asthma\par Younger brother has asthma\par Mother and niece have autoimmune disease, but she is unsure which one

## 2023-05-11 NOTE — DISCUSSION/SUMMARY
[FreeTextEntry1] : ATTENDING'S SUMMARY:\par 5-11-23:\par no pallor, no icterus\par no JVD, no hepatojugular reflux, no HSM\par no cervical adenopathy, no supraclavicular adenopathy\par DIstant heart sounds, normal s1/s2, no murmurs, rubs or gallops\par good air entry bilaterally, no wheezing, rhonchi or crackles\par no cyanosis, no clubbing, no articular manifestations, no thickening of the skin\par no pedal edema

## 2023-05-11 NOTE — ADDENDUM
[FreeTextEntry1] : I, Dr. Rafael Persaud, personally performed the evaluation and management services for this patient who was seen by my colleague-Dr. Fulton approx 2 yrs ago.  The E/M includes conducting patient interview, detailed physical examination, assessing all new and exacerbated conditions, reviewing all investigations and establishing a new plan of care.  Today, my ACP, Ms. America King, was part of the evaluation and management.  She understands changes in the patient management.

## 2023-05-11 NOTE — DISCUSSION/SUMMARY
[Patient] : the patient [___ Month(s)] : in [unfilled] month(s) [EKG obtained to assist in diagnosis and management of assessed problem(s)] : EKG obtained to assist in diagnosis and management of assessed problem(s) [FreeTextEntry1] : 58 y/o female with h/o htn, hl, predm, family h/o early cvd, gerd, overweight, cad s/p LISA pLAD 2018 who presents for f/up \par \par -pulm recs \par -Cath 4/23: patent LAD stent\par -ordered echo for MR\par -CTA 4/22: mid LAD stent patent, nonobstructive dLAD \par -Echo 2022: ef 60-65%, no wma, borderline prolapse leaflets, mild to mod MR, trace ai/tr\par -continue olmesartan 40 qd\par -continue hctz 12.5 mg qd\par -continue metoprolol 50 qd\par -advised she f/up with cspine surgeon given left arm numbness/tingling\par -holter 9/19: avg hr 62, 27 VE, 13 SVE\par -CTA 8/19: patent mid LAD stent, prox/mid LCx minimal stenosis, RCA normal\par -Echo 8/19: normal lv size/thickness, ef 65%, trace pr/mr/tr, normal pap, normal ivc\par -KASSANDRA: 8/19: neg for dvt\par -ekg ordered today - sb, normal intervals, no st/t changes\par -labs 2023 reviewed\par -CTA 9/18 with severe stenosis noncalcified plaque mid LAD\par -Echo 9/18: normal lv size/thickness, no wma, ef 60-65%, no valvular disease\par -continue asa, plavix and lipitor \par -cath 9/20/18 LM nl, pLAD 80% s/p PROMUS LISA, Lcx/Rca normal, ef 65%\par bifurcation, Successful LISA prox LAD, LCx & RCA normal, EF normal. \par -CAIN w doppler 12/18: normal\par -gi referral\par -counseled on cvd risk factors\par -f/up 4 months for cad, htn, cp/sob\par \par I have spent 30 minutes reviewing records, labs, tests and discussing importance of medication for htn and cp/sob evaluation. \par

## 2023-05-11 NOTE — ASSESSMENT
[FreeTextEntry1] : 5-11-23:\par \par It was a pleasure to meet Renita today in consultation. Her respiratory issues are summarized:\par \par 1. Shortness of breath\par Renita presents with dyspnea on exertion for the past year. When walking up stairs, she describes chest heaviness, chest tightness, cough and possible wheezing\par Possible causes include:\par a. Asthma. She has several family members who have asthma. PFT shows mild restrictive lung defect. We will order repeat PFT, 6MWT, NIOX\par b. Pulmonary embolism. Her mother has a history of blood clots. We will check a D-dimer\par c. Autoimmune disease. She has a family history of autoimmune disease. We will check RF screening labs (CH50, ANDREA, RF)\par d. Cardiac disease. She is followed by Dr. Key and recently had a cardiac cath. We will check a pro-BNP\par e. Pulmonary hypertension. We will order an echo to be done at [facility]\par f. Diaphragmatic weakness. We will check a PI max when she comes in for PFT\par g. ILD. PFT shows mild restrictive lung defect. Ordered low dose chest CT to evaluate for fibrosis / scarring\par \par 2. Snoring\par Renita does not sleep well. Her  tells her that she snores. [place] is [place]\par \par Plan:\par - We will repeat a home sleep study\par \par 3. Decreased diffusion capacity\par PFT done 10/24/22 shows moderately reduced diffusion capacity, 11.74 (54%).\par \par Plan:\par - Echo to rule out pulmonary hypertension\par - Repeat PFTs\par - 6MWT to evaluate for desaturation\par \par 3. Health Maintenance\par She will qualify for pneumococcal 20 vaccine next visit when she turns 58.\par \par Return to clinic: 2 months

## 2023-05-12 ENCOUNTER — NON-APPOINTMENT (OUTPATIENT)
Age: 58
End: 2023-05-12

## 2023-05-12 LAB
ALBUMIN SERPL ELPH-MCNC: 4.8 G/DL
ALP BLD-CCNC: 99 U/L
ALT SERPL-CCNC: 17 U/L
ANION GAP SERPL CALC-SCNC: 13 MMOL/L
AST SERPL-CCNC: 23 U/L
BASOPHILS # BLD AUTO: 0.03 K/UL
BASOPHILS NFR BLD AUTO: 0.5 %
BILIRUB SERPL-MCNC: 0.3 MG/DL
BUN SERPL-MCNC: 19 MG/DL
CALCIUM SERPL-MCNC: 10 MG/DL
CH50 SERPL-MCNC: 69 U/ML
CHLORIDE SERPL-SCNC: 104 MMOL/L
CO2 SERPL-SCNC: 26 MMOL/L
CREAT SERPL-MCNC: 0.93 MG/DL
DEPRECATED D DIMER PPP IA-ACNC: <150 NG/ML DDU
EGFR: 71 ML/MIN/1.73M2
EOSINOPHIL # BLD AUTO: 0.25 K/UL
EOSINOPHIL NFR BLD AUTO: 4.4 %
GLUCOSE SERPL-MCNC: 88 MG/DL
HCT VFR BLD CALC: 36.1 %
HGB BLD-MCNC: 11.1 G/DL
IMM GRANULOCYTES NFR BLD AUTO: 0.2 %
LYMPHOCYTES # BLD AUTO: 1.92 K/UL
LYMPHOCYTES NFR BLD AUTO: 33.7 %
MAN DIFF?: NORMAL
MCHC RBC-ENTMCNC: 30.7 GM/DL
MCHC RBC-ENTMCNC: 31.2 PG
MCV RBC AUTO: 101.4 FL
MONOCYTES # BLD AUTO: 0.39 K/UL
MONOCYTES NFR BLD AUTO: 6.8 %
NEUTROPHILS # BLD AUTO: 3.1 K/UL
NEUTROPHILS NFR BLD AUTO: 54.4 %
NT-PROBNP SERPL-MCNC: 132 PG/ML
PLATELET # BLD AUTO: 283 K/UL
POTASSIUM SERPL-SCNC: 4.3 MMOL/L
PROT SERPL-MCNC: 7.5 G/DL
RBC # BLD: 3.56 M/UL
RBC # FLD: 13 %
RHEUMATOID FACT SER QL: <10 IU/ML
SODIUM SERPL-SCNC: 143 MMOL/L
WBC # FLD AUTO: 5.7 K/UL

## 2023-05-15 ENCOUNTER — TRANSCRIPTION ENCOUNTER (OUTPATIENT)
Age: 58
End: 2023-05-15

## 2023-05-15 ENCOUNTER — NON-APPOINTMENT (OUTPATIENT)
Age: 58
End: 2023-05-15

## 2023-05-15 LAB — ANA SER IF-ACNC: NEGATIVE

## 2023-05-30 PROBLEM — I25.10 ATHEROSCLEROTIC HEART DISEASE OF NATIVE CORONARY ARTERY WITHOUT ANGINA PECTORIS: Chronic | Status: ACTIVE | Noted: 2023-04-14

## 2023-06-20 ENCOUNTER — OUTPATIENT (OUTPATIENT)
Dept: OUTPATIENT SERVICES | Facility: HOSPITAL | Age: 58
LOS: 1 days | End: 2023-06-20
Payer: COMMERCIAL

## 2023-06-20 ENCOUNTER — APPOINTMENT (OUTPATIENT)
Dept: SLEEP CENTER | Facility: HOME HEALTH | Age: 58
End: 2023-06-20
Payer: COMMERCIAL

## 2023-06-20 DIAGNOSIS — Z90.49 ACQUIRED ABSENCE OF OTHER SPECIFIED PARTS OF DIGESTIVE TRACT: Chronic | ICD-10-CM

## 2023-06-20 DIAGNOSIS — Z98.89 OTHER SPECIFIED POSTPROCEDURAL STATES: Chronic | ICD-10-CM

## 2023-06-20 DIAGNOSIS — Z90.89 ACQUIRED ABSENCE OF OTHER ORGANS: Chronic | ICD-10-CM

## 2023-06-20 DIAGNOSIS — D25.9 LEIOMYOMA OF UTERUS, UNSPECIFIED: Chronic | ICD-10-CM

## 2023-06-20 PROCEDURE — 95800 SLP STDY UNATTENDED: CPT

## 2023-06-20 PROCEDURE — 95800 SLP STDY UNATTENDED: CPT | Mod: 26

## 2023-06-22 DIAGNOSIS — G47.33 OBSTRUCTIVE SLEEP APNEA (ADULT) (PEDIATRIC): ICD-10-CM

## 2023-06-26 ENCOUNTER — NON-APPOINTMENT (OUTPATIENT)
Age: 58
End: 2023-06-26

## 2023-06-26 ENCOUNTER — TRANSCRIPTION ENCOUNTER (OUTPATIENT)
Age: 58
End: 2023-06-26

## 2023-07-02 ENCOUNTER — FORM ENCOUNTER (OUTPATIENT)
Age: 58
End: 2023-07-02

## 2023-07-03 ENCOUNTER — TRANSCRIPTION ENCOUNTER (OUTPATIENT)
Age: 58
End: 2023-07-03

## 2023-07-03 ENCOUNTER — OUTPATIENT (OUTPATIENT)
Dept: OUTPATIENT SERVICES | Facility: HOSPITAL | Age: 58
LOS: 1 days | End: 2023-07-03
Payer: COMMERCIAL

## 2023-07-03 ENCOUNTER — NON-APPOINTMENT (OUTPATIENT)
Age: 58
End: 2023-07-03

## 2023-07-03 ENCOUNTER — APPOINTMENT (OUTPATIENT)
Dept: CT IMAGING | Facility: HOSPITAL | Age: 58
End: 2023-07-03

## 2023-07-03 ENCOUNTER — RESULT REVIEW (OUTPATIENT)
Age: 58
End: 2023-07-03

## 2023-07-03 DIAGNOSIS — Z90.89 ACQUIRED ABSENCE OF OTHER ORGANS: Chronic | ICD-10-CM

## 2023-07-03 DIAGNOSIS — D25.9 LEIOMYOMA OF UTERUS, UNSPECIFIED: Chronic | ICD-10-CM

## 2023-07-03 DIAGNOSIS — Z90.49 ACQUIRED ABSENCE OF OTHER SPECIFIED PARTS OF DIGESTIVE TRACT: Chronic | ICD-10-CM

## 2023-07-03 DIAGNOSIS — Z98.89 OTHER SPECIFIED POSTPROCEDURAL STATES: Chronic | ICD-10-CM

## 2023-07-03 PROCEDURE — 93306 TTE W/DOPPLER COMPLETE: CPT | Mod: 26

## 2023-07-03 PROCEDURE — 93306 TTE W/DOPPLER COMPLETE: CPT

## 2023-07-03 PROCEDURE — 71250 CT THORAX DX C-: CPT | Mod: 26

## 2023-07-03 PROCEDURE — 71250 CT THORAX DX C-: CPT

## 2023-07-06 ENCOUNTER — NON-APPOINTMENT (OUTPATIENT)
Age: 58
End: 2023-07-06

## 2023-07-07 ENCOUNTER — TRANSCRIPTION ENCOUNTER (OUTPATIENT)
Age: 58
End: 2023-07-07

## 2023-09-20 PROBLEM — R06.02 SHORTNESS OF BREATH: Status: ACTIVE | Noted: 2022-02-28

## 2023-09-22 ENCOUNTER — NON-APPOINTMENT (OUTPATIENT)
Age: 58
End: 2023-09-22

## 2023-09-26 ENCOUNTER — NON-APPOINTMENT (OUTPATIENT)
Age: 58
End: 2023-09-26

## 2023-09-26 ENCOUNTER — OUTPATIENT (OUTPATIENT)
Dept: OUTPATIENT SERVICES | Facility: HOSPITAL | Age: 58
LOS: 1 days | End: 2023-09-26
Payer: COMMERCIAL

## 2023-09-26 ENCOUNTER — APPOINTMENT (OUTPATIENT)
Dept: PULMONOLOGY | Facility: CLINIC | Age: 58
End: 2023-09-26
Payer: COMMERCIAL

## 2023-09-26 VITALS
OXYGEN SATURATION: 100 % | WEIGHT: 158 LBS | TEMPERATURE: 97 F | RESPIRATION RATE: 12 BRPM | BODY MASS INDEX: 29.08 KG/M2 | DIASTOLIC BLOOD PRESSURE: 60 MMHG | HEIGHT: 62 IN | SYSTOLIC BLOOD PRESSURE: 104 MMHG | HEART RATE: 61 BPM

## 2023-09-26 DIAGNOSIS — Z90.89 ACQUIRED ABSENCE OF OTHER ORGANS: Chronic | ICD-10-CM

## 2023-09-26 DIAGNOSIS — Z98.89 OTHER SPECIFIED POSTPROCEDURAL STATES: Chronic | ICD-10-CM

## 2023-09-26 DIAGNOSIS — D25.9 LEIOMYOMA OF UTERUS, UNSPECIFIED: Chronic | ICD-10-CM

## 2023-09-26 DIAGNOSIS — Z23 ENCOUNTER FOR IMMUNIZATION: ICD-10-CM

## 2023-09-26 DIAGNOSIS — G47.33 OBSTRUCTIVE SLEEP APNEA (ADULT) (PEDIATRIC): ICD-10-CM

## 2023-09-26 DIAGNOSIS — Z90.49 ACQUIRED ABSENCE OF OTHER SPECIFIED PARTS OF DIGESTIVE TRACT: Chronic | ICD-10-CM

## 2023-09-26 DIAGNOSIS — R06.02 SHORTNESS OF BREATH: ICD-10-CM

## 2023-09-26 PROCEDURE — G0009: CPT

## 2023-09-26 PROCEDURE — 90677 PCV20 VACCINE IM: CPT

## 2023-09-26 PROCEDURE — 94621 CARDIOPULM EXERCISE TESTING: CPT | Mod: 26

## 2023-09-26 PROCEDURE — 94621 CARDIOPULM EXERCISE TESTING: CPT

## 2023-09-26 PROCEDURE — 99215 OFFICE O/P EST HI 40 MIN: CPT | Mod: 25

## 2023-09-26 RX ORDER — ALBUTEROL 90 UG/1
2 AEROSOL, METERED ORAL ONCE
Refills: 0 | Status: DISCONTINUED | OUTPATIENT
Start: 2023-09-26 | End: 2023-10-11

## 2023-12-03 NOTE — HISTORY OF PRESENT ILLNESS
[FreeTextEntry1] : 56 y/o female with h/o cad s/p LAD stent '18, htn, hl, predm, family h/o early cvd, overweight, gerd who presents for f/up\par \par Last seen 3/23\par \par sent for Cath\par \par Cath : patent LAD stent\par \par  \par \par CTA : mid LAD stent patent, nonobstructive dLAD \par Echo : ef 60-65%, no wma, borderline prolapse leaflets, mild to mod MR, trace ai/tr\par \par \par Notes continued intermittent sharp pinching cp and sob w exertion\par no palpitations, syncope, edema, orthopnea, pnd\par \par saw Pulm for f/up - testing pending\par \par  \par \par Norvasc dc'd due to concern over edema and lisinopril 5 mg daily started\par Noted a cough on lisinopril, so changed to losartan which was recalled\par then placed on olmesartan\par HR noted to be low on day of echo and BB dose decreased to 50 daily\par \par Holter : avg hr 62, 27 VE, 13 SVE\par \par \par CTA : patent mid LAD stent, prox/mid LCx minimal stenosis, RCA normal\par \par Echo : normal lv size/thickness, ef 65%, trace pr/mr/tr, normal pap, normal ivc\par \par KASSANDRA: : neg for dvt\par \par \par Seen for cp and had CTA  with severe stenosis noncalcified plaque mid LAD\par \par \par Sent for cath 18 LM nl, pLAD 80% FFR 0.75 - s/p LISA Promus pLAD, normal Lcx/RCA, ef 65%, no as/mr, edp 11\par bifurcation, Successful LISA prox LAD, LCx & RCA normal, EF normal.\par \par Echo : normal lv size/thickness, no wma, ef 60-65%, no valvular disease\par \par  \par \par CAIN w doppler:  - normal\par \par \par HTN diagnosed \par HL diagnosed \par \par Had 1 ectopic and 2 miscarriages\par \par Started menopause age 45\par \par \par \par PMH/PSH:\par htn\par hl\par overweight\par predm\par c spine surgery \par fibroid surgery \par tonsillectomy 1969\par gerd\par rosacea\par cad s/p LISA pLAD \par nephrolithiasis\par \par \par \par ALL:\par pcn - facial/throat swelling\par eggs\par \par \par SH:\par no tobacco\par social etoh\par no drugs\par from [place]\par \par no kids\par 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

## 2024-03-03 RX ORDER — OLMESARTAN MEDOXOMIL 40 MG/1
40 TABLET, FILM COATED ORAL
Qty: 90 | Refills: 1 | Status: ACTIVE | COMMUNITY
Start: 2020-03-12 | End: 1900-01-01

## 2024-03-03 RX ORDER — HYDROCHLOROTHIAZIDE 12.5 MG/1
12.5 TABLET ORAL
Qty: 90 | Refills: 3 | Status: ACTIVE | COMMUNITY
Start: 2020-12-10 | End: 1900-01-01

## 2024-03-03 RX ORDER — ASPIRIN ENTERIC COATED TABLETS 81 MG 81 MG/1
81 TABLET, DELAYED RELEASE ORAL DAILY
Qty: 90 | Refills: 3 | Status: ACTIVE | COMMUNITY
Start: 2018-08-23 | End: 1900-01-01

## 2024-03-25 RX ORDER — CLOPIDOGREL BISULFATE 75 MG/1
75 TABLET, FILM COATED ORAL DAILY
Qty: 90 | Refills: 1 | Status: ACTIVE | COMMUNITY
Start: 2018-09-21 | End: 1900-01-01

## 2024-06-02 RX ORDER — ATORVASTATIN CALCIUM 40 MG/1
40 TABLET, FILM COATED ORAL
Qty: 90 | Refills: 2 | Status: ACTIVE | COMMUNITY
Start: 2018-09-21 | End: 1900-01-01

## 2024-06-24 RX ORDER — CLOPIDOGREL BISULFATE 75 MG/1
75 TABLET, FILM COATED ORAL DAILY
Qty: 90 | Refills: 1 | Status: ACTIVE | COMMUNITY
Start: 2018-12-13 | End: 1900-01-01

## 2024-06-24 RX ORDER — METOPROLOL SUCCINATE 50 MG/1
50 TABLET, EXTENDED RELEASE ORAL DAILY
Qty: 90 | Refills: 1 | Status: ACTIVE | COMMUNITY
Start: 2018-08-23 | End: 1900-01-01

## 2024-09-02 ENCOUNTER — NON-APPOINTMENT (OUTPATIENT)
Age: 59
End: 2024-09-02

## 2024-10-28 ENCOUNTER — NON-APPOINTMENT (OUTPATIENT)
Age: 59
End: 2024-10-28

## 2024-10-28 ENCOUNTER — APPOINTMENT (OUTPATIENT)
Dept: HEART AND VASCULAR | Facility: CLINIC | Age: 59
End: 2024-10-28
Payer: COMMERCIAL

## 2024-10-28 VITALS
BODY MASS INDEX: 29.63 KG/M2 | SYSTOLIC BLOOD PRESSURE: 128 MMHG | WEIGHT: 161 LBS | DIASTOLIC BLOOD PRESSURE: 80 MMHG | HEART RATE: 63 BPM | HEIGHT: 62 IN | TEMPERATURE: 97.7 F | OXYGEN SATURATION: 98 %

## 2024-10-28 PROCEDURE — 36415 COLL VENOUS BLD VENIPUNCTURE: CPT

## 2024-10-28 PROCEDURE — 99214 OFFICE O/P EST MOD 30 MIN: CPT

## 2024-10-28 PROCEDURE — 93000 ELECTROCARDIOGRAM COMPLETE: CPT

## 2024-10-29 LAB
ALBUMIN SERPL ELPH-MCNC: 4.4 G/DL
ALP BLD-CCNC: 107 U/L
ALT SERPL-CCNC: 21 U/L
ANION GAP SERPL CALC-SCNC: 14 MMOL/L
AST SERPL-CCNC: 23 U/L
BASOPHILS # BLD AUTO: 0.02 K/UL
BASOPHILS NFR BLD AUTO: 0.3 %
BILIRUB SERPL-MCNC: 0.3 MG/DL
BUN SERPL-MCNC: 15 MG/DL
CALCIUM SERPL-MCNC: 10.1 MG/DL
CHLORIDE SERPL-SCNC: 104 MMOL/L
CHOLEST SERPL-MCNC: 142 MG/DL
CO2 SERPL-SCNC: 24 MMOL/L
CREAT SERPL-MCNC: 0.96 MG/DL
CREAT SPEC-SCNC: 172 MG/DL
EGFR: 67 ML/MIN/1.73M2
EOSINOPHIL # BLD AUTO: 0.1 K/UL
EOSINOPHIL NFR BLD AUTO: 1.6 %
ESTIMATED AVERAGE GLUCOSE: 128 MG/DL
GLUCOSE SERPL-MCNC: 108 MG/DL
HBA1C MFR BLD HPLC: 6.1 %
HCT VFR BLD CALC: 37.1 %
HDLC SERPL-MCNC: 46 MG/DL
HGB BLD-MCNC: 11.7 G/DL
IMM GRANULOCYTES NFR BLD AUTO: 0.2 %
LDLC SERPL CALC-MCNC: 75 MG/DL
LYMPHOCYTES # BLD AUTO: 1.38 K/UL
LYMPHOCYTES NFR BLD AUTO: 22.1 %
MAGNESIUM SERPL-MCNC: 1.6 MG/DL
MAN DIFF?: NORMAL
MCHC RBC-ENTMCNC: 31.5 GM/DL
MCHC RBC-ENTMCNC: 31.5 PG
MCV RBC AUTO: 100 FL
MICROALBUMIN 24H UR DL<=1MG/L-MCNC: <1.2 MG/DL
MICROALBUMIN/CREAT 24H UR-RTO: NORMAL MG/G
MONOCYTES # BLD AUTO: 0.31 K/UL
MONOCYTES NFR BLD AUTO: 5 %
NEUTROPHILS # BLD AUTO: 4.42 K/UL
NEUTROPHILS NFR BLD AUTO: 70.8 %
NONHDLC SERPL-MCNC: 95 MG/DL
PLATELET # BLD AUTO: 289 K/UL
POTASSIUM SERPL-SCNC: 3.9 MMOL/L
PROT SERPL-MCNC: 7.2 G/DL
RBC # BLD: 3.71 M/UL
RBC # FLD: 12.6 %
SODIUM SERPL-SCNC: 142 MMOL/L
TRIGL SERPL-MCNC: 113 MG/DL
TSH SERPL-ACNC: 1.77 UIU/ML
WBC # FLD AUTO: 6.24 K/UL

## 2024-11-03 LAB — APO LP(A) SERPL-MCNC: 32.2 NMOL/L

## 2024-11-15 ENCOUNTER — NON-APPOINTMENT (OUTPATIENT)
Age: 59
End: 2024-11-15

## 2024-12-04 ENCOUNTER — APPOINTMENT (OUTPATIENT)
Dept: HEART AND VASCULAR | Facility: CLINIC | Age: 59
End: 2024-12-04
Payer: COMMERCIAL

## 2024-12-04 VITALS — HEIGHT: 62 IN | BODY MASS INDEX: 30.36 KG/M2 | WEIGHT: 165 LBS

## 2024-12-04 DIAGNOSIS — I25.10 ATHEROSCLEROTIC HEART DISEASE OF NATIVE CORONARY ARTERY W/OUT ANGINA PECTORIS: ICD-10-CM

## 2024-12-04 DIAGNOSIS — E66.9 OBESITY, UNSPECIFIED: ICD-10-CM

## 2024-12-04 DIAGNOSIS — R73.03 PREDIABETES.: ICD-10-CM

## 2024-12-04 PROCEDURE — 99214 OFFICE O/P EST MOD 30 MIN: CPT

## 2024-12-05 RX ORDER — METFORMIN ER 500 MG 500 MG/1
500 TABLET ORAL
Qty: 90 | Refills: 1 | Status: ACTIVE | COMMUNITY
Start: 2024-12-05 | End: 1900-01-01

## 2024-12-05 RX ORDER — SEMAGLUTIDE 0.25 MG/.5ML
0.25 INJECTION, SOLUTION SUBCUTANEOUS
Qty: 1 | Refills: 2 | Status: DISCONTINUED | COMMUNITY
Start: 2024-12-04 | End: 2024-12-05

## 2025-01-31 ENCOUNTER — TRANSCRIPTION ENCOUNTER (OUTPATIENT)
Age: 60
End: 2025-01-31

## 2025-02-03 ENCOUNTER — TRANSCRIPTION ENCOUNTER (OUTPATIENT)
Age: 60
End: 2025-02-03

## 2025-02-15 ENCOUNTER — NON-APPOINTMENT (OUTPATIENT)
Age: 60
End: 2025-02-15

## 2025-02-28 ENCOUNTER — RX RENEWAL (OUTPATIENT)
Age: 60
End: 2025-02-28

## 2025-05-05 ENCOUNTER — TRANSCRIPTION ENCOUNTER (OUTPATIENT)
Age: 60
End: 2025-05-05

## 2025-05-06 ENCOUNTER — TRANSCRIPTION ENCOUNTER (OUTPATIENT)
Age: 60
End: 2025-05-06

## 2025-05-14 ENCOUNTER — APPOINTMENT (OUTPATIENT)
Dept: HEART AND VASCULAR | Facility: CLINIC | Age: 60
End: 2025-05-14

## 2025-05-16 ENCOUNTER — APPOINTMENT (OUTPATIENT)
Dept: HEART AND VASCULAR | Facility: CLINIC | Age: 60
End: 2025-05-16

## 2025-05-16 ENCOUNTER — NON-APPOINTMENT (OUTPATIENT)
Age: 60
End: 2025-05-16

## 2025-05-16 VITALS
BODY MASS INDEX: 28.89 KG/M2 | TEMPERATURE: 97.8 F | DIASTOLIC BLOOD PRESSURE: 68 MMHG | HEIGHT: 62 IN | HEART RATE: 58 BPM | WEIGHT: 157 LBS | SYSTOLIC BLOOD PRESSURE: 107 MMHG | OXYGEN SATURATION: 98 %

## 2025-05-16 DIAGNOSIS — I10 ESSENTIAL (PRIMARY) HYPERTENSION: ICD-10-CM

## 2025-05-16 DIAGNOSIS — E66.9 OBESITY, UNSPECIFIED: ICD-10-CM

## 2025-05-16 PROCEDURE — 99213 OFFICE O/P EST LOW 20 MIN: CPT

## 2025-05-16 PROCEDURE — 93000 ELECTROCARDIOGRAM COMPLETE: CPT

## 2025-05-16 PROCEDURE — 36415 COLL VENOUS BLD VENIPUNCTURE: CPT

## 2025-05-19 LAB
ALBUMIN SERPL ELPH-MCNC: 4.4 G/DL
ALP BLD-CCNC: 114 U/L
ALT SERPL-CCNC: 22 U/L
ANION GAP SERPL CALC-SCNC: 13 MMOL/L
AST SERPL-CCNC: 24 U/L
BILIRUB SERPL-MCNC: 0.3 MG/DL
BUN SERPL-MCNC: 17 MG/DL
CALCIUM SERPL-MCNC: 9.5 MG/DL
CHLORIDE SERPL-SCNC: 104 MMOL/L
CHOLEST SERPL-MCNC: 130 MG/DL
CO2 SERPL-SCNC: 25 MMOL/L
CREAT SERPL-MCNC: 0.91 MG/DL
EGFRCR SERPLBLD CKD-EPI 2021: 72 ML/MIN/1.73M2
ESTIMATED AVERAGE GLUCOSE: 137 MG/DL
GLUCOSE SERPL-MCNC: 94 MG/DL
HBA1C MFR BLD HPLC: 6.4 %
HDLC SERPL-MCNC: 41 MG/DL
LDLC SERPL-MCNC: 65 MG/DL
NONHDLC SERPL-MCNC: 89 MG/DL
POTASSIUM SERPL-SCNC: 4.2 MMOL/L
PROT SERPL-MCNC: 7.1 G/DL
SODIUM SERPL-SCNC: 143 MMOL/L
TRIGL SERPL-MCNC: 135 MG/DL

## 2025-05-20 ENCOUNTER — NON-APPOINTMENT (OUTPATIENT)
Age: 60
End: 2025-05-20

## 2025-05-21 ENCOUNTER — NON-APPOINTMENT (OUTPATIENT)
Age: 60
End: 2025-05-21

## 2025-06-17 ENCOUNTER — APPOINTMENT (OUTPATIENT)
Dept: HEART AND VASCULAR | Facility: CLINIC | Age: 60
End: 2025-06-17

## 2025-07-07 ENCOUNTER — TRANSCRIPTION ENCOUNTER (OUTPATIENT)
Age: 60
End: 2025-07-07

## 2025-07-17 ENCOUNTER — TRANSCRIPTION ENCOUNTER (OUTPATIENT)
Age: 60
End: 2025-07-17